# Patient Record
Sex: FEMALE | Race: WHITE | Employment: PART TIME | URBAN - NONMETROPOLITAN AREA
[De-identification: names, ages, dates, MRNs, and addresses within clinical notes are randomized per-mention and may not be internally consistent; named-entity substitution may affect disease eponyms.]

---
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
## 2021-04-06 ENCOUNTER — OFFICE VISIT (OUTPATIENT)
Dept: FAMILY MEDICINE CLINIC | Age: 21
End: 2021-04-06
Payer: COMMERCIAL

## 2021-04-06 VITALS
HEART RATE: 76 BPM | WEIGHT: 132 LBS | BODY MASS INDEX: 22.66 KG/M2 | DIASTOLIC BLOOD PRESSURE: 80 MMHG | SYSTOLIC BLOOD PRESSURE: 120 MMHG

## 2021-04-06 DIAGNOSIS — Z00.00 WELL ADULT EXAM: Primary | ICD-10-CM

## 2021-04-06 DIAGNOSIS — N94.6 DYSMENORRHEA: ICD-10-CM

## 2021-04-06 DIAGNOSIS — J30.81 ALLERGIC RHINITIS DUE TO ANIMAL HAIR AND DANDER: ICD-10-CM

## 2021-04-06 DIAGNOSIS — G43.829 MENSTRUAL MIGRAINE WITHOUT STATUS MIGRAINOSUS, NOT INTRACTABLE: ICD-10-CM

## 2021-04-06 PROCEDURE — 99395 PREV VISIT EST AGE 18-39: CPT | Performed by: PHYSICIAN ASSISTANT

## 2021-04-06 RX ORDER — NORETHINDRONE ACETATE AND ETHINYL ESTRADIOL 1MG-20(21)
KIT ORAL
Qty: 28 TABLET | Refills: 11 | Status: SHIPPED | OUTPATIENT
Start: 2021-04-06 | End: 2022-03-04

## 2021-04-06 RX ORDER — MONTELUKAST SODIUM 10 MG/1
10 TABLET ORAL NIGHTLY
Qty: 30 TABLET | Refills: 11 | Status: SHIPPED | OUTPATIENT
Start: 2021-04-06

## 2021-04-06 SDOH — ECONOMIC STABILITY: INCOME INSECURITY: HOW HARD IS IT FOR YOU TO PAY FOR THE VERY BASICS LIKE FOOD, HOUSING, MEDICAL CARE, AND HEATING?: NOT HARD AT ALL

## 2021-04-06 SDOH — ECONOMIC STABILITY: FOOD INSECURITY: WITHIN THE PAST 12 MONTHS, THE FOOD YOU BOUGHT JUST DIDN'T LAST AND YOU DIDN'T HAVE MONEY TO GET MORE.: NEVER TRUE

## 2021-04-06 ASSESSMENT — PATIENT HEALTH QUESTIONNAIRE - PHQ9
1. LITTLE INTEREST OR PLEASURE IN DOING THINGS: 0
SUM OF ALL RESPONSES TO PHQ9 QUESTIONS 1 & 2: 0
SUM OF ALL RESPONSES TO PHQ QUESTIONS 1-9: 0
SUM OF ALL RESPONSES TO PHQ QUESTIONS 1-9: 0

## 2021-04-07 NOTE — PROGRESS NOTES
CHIEF COMPLAINT  Chief Complaint   Patient presents with    Annual Exam    Other     blood sugar issue       [address] is a 22 y.o. female who presents to the office for annual wellness examination and follow-up of chronic medical conditions. Patient says that she has had a few episodes of a feeling like she might pass out over the past few months. Her friend checked her blood sugar and it was 170. Patient tried increasing her protein and cutting back on carbohydrates. Patient says that dysmenorrhea and migraines have been well-controlled on current treatment. She takes Singulair for allergies and finds this effective as well. Patient says that overall she is doing well and denies concerns or complaints today. ROS  All other review of systems negative, except for those noted. PAST MEDICAL HISTORY    Past Medical History:   Diagnosis Date    Asthma     Headache        SURGICAL HISTORY    History reviewed. No pertinent surgical history.     FAMILY HISTORY    Family History   Problem Relation Age of Onset    Asthma Mother     Other Mother         migranes    Heart Disease Maternal Grandfather         balloon surgery    Hypertension Maternal Grandfather     Kidney Disease Maternal Grandfather         stones    High Cholesterol Maternal Grandfather     Cancer Paternal Grandfather         leukemia    Asthma Maternal Aunt     Diabetes Maternal Grandmother        SOCIAL HISTORY    Social History     Socioeconomic History    Marital status: Single     Spouse name: None    Number of children: None    Years of education: None    Highest education level: None   Occupational History    None   Social Needs    Financial resource strain: Not hard at all   Marta-Rivka insecurity     Worry: Never true     Inability: Never true    Transportation needs     Medical: No     Non-medical: No   Tobacco Use    Smoking status: Never Smoker    Smokeless tobacco: Never Used   Substance and Sexual Activity    Alcohol use: No    Drug use: No    Sexual activity: None   Lifestyle    Physical activity     Days per week: None     Minutes per session: None    Stress: None   Relationships    Social connections     Talks on phone: None     Gets together: None     Attends Lutheran service: None     Active member of club or organization: None     Attends meetings of clubs or organizations: None     Relationship status: None    Intimate partner violence     Fear of current or ex partner: None     Emotionally abused: None     Physically abused: None     Forced sexual activity: None   Other Topics Concern    None   Social History Narrative    None       MEDICATIONS  Current Outpatient Medications   Medication Sig Dispense Refill    norethindrone-ethinyl estradiol (BLISOVI FE 1/20) 1-20 MG-MCG per tablet TAKE 1 TABLET BY MOUTH ONE TIME A DAY 28 tablet 11    montelukast (SINGULAIR) 10 MG tablet Take 1 tablet by mouth nightly 30 tablet 11    albuterol sulfate  (90 Base) MCG/ACT inhaler Inhale 2 puffs into the lungs 4 times daily as needed for Wheezing 1 Inhaler 0     No current facility-administered medications for this visit. ALLERGIES  Allergies   Allergen Reactions    Seasonal        PHYSICAL EXAM:   Vital Signs: /80 (Site: Left Upper Arm, Position: Sitting, Cuff Size: Medium Adult)   Pulse 76   Wt 132 lb (59.9 kg)   LMP 03/16/2021   BMI 22.66 kg/m²   Constitutional:  Alert and oriented x 3   HENT:  Normocephalic, Atraumatic, Bilateral external ears normal, Oropharynx moist, No oral exudates, Nose normal. Neck- Normal range of motion, No tenderness, Supple, No stridor. Eyes:  PERRL, Conjunctiva normal, No discharge. Respiratory:  Normal breath sounds, No respiratory distress, No wheezing, No chest tenderness. Cardiovascular:  Normal heart rate, Normal rhythm  GI:  Bowel sounds normal, Soft, No tenderness, No masses, No pulsatile masses.    Musculoskeletal:  Intact distal pulses, No edema, No tenderness, No cyanosis, No clubbing. Good range of motion in all major joints. No tenderness to palpation or major deformities noted. Back- No tenderness. Integument:  Warm, Dry, No erythema, No rash. Lymphatic:  No lymphadenopathy noted. Neurologic:  Alert & oriented x 3, Normal motor function, Normal sensory function, No focal deficits noted. Psychiatric:  Affect normal, Mood normal.     RESULTS  Ordered in this encounter. FINAL DIAGNOSIS AND ORDERS   Diagnosis Orders   1. Well adult exam  Comprehensive Metabolic Panel    Lipid Panel    Hemoglobin A1C    C-Peptide    Insulin, Total   2. Menstrual migraine without status migrainosus, not intractable  norethindrone-ethinyl estradiol (BLISOVI FE 1/20) 1-20 MG-MCG per tablet   3. Dysmenorrhea  norethindrone-ethinyl estradiol (BLISOVI FE 1/20) 1-20 MG-MCG per tablet   4. Allergic rhinitis due to animal hair and dander  montelukast (SINGULAIR) 10 MG tablet       ASSESSMENT & PLAN  1. Interval history reviewed. Update fasting laboratory studies. Chronic medications refilled for patient. Healthy diet and routine exercise. Discussed COVID vaccination in detail. Follow-up annually and PRN. DISCHARGE MEDS  Outpatient Encounter Medications as of 4/6/2021   Medication Sig Dispense Refill    norethindrone-ethinyl estradiol (BLISOVI FE 1/20) 1-20 MG-MCG per tablet TAKE 1 TABLET BY MOUTH ONE TIME A DAY 28 tablet 11    montelukast (SINGULAIR) 10 MG tablet Take 1 tablet by mouth nightly 30 tablet 11    albuterol sulfate  (90 Base) MCG/ACT inhaler Inhale 2 puffs into the lungs 4 times daily as needed for Wheezing 1 Inhaler 0    [DISCONTINUED] norethindrone-ethinyl estradiol (BLISOVI FE 1/20) 1-20 MG-MCG per tablet TAKE 1 TABLET BY MOUTH ONE TIME A DAY 28 tablet 11    [DISCONTINUED] montelukast (SINGULAIR) 10 MG tablet Take 1 tablet by mouth nightly 30 tablet 11     No facility-administered encounter medications on file as of 4/6/2021.

## 2021-04-16 ENCOUNTER — HOSPITAL ENCOUNTER (OUTPATIENT)
Dept: LAB | Age: 21
Discharge: HOME | End: 2021-04-16
Payer: COMMERCIAL

## 2021-04-16 DIAGNOSIS — Z00.00 WELL ADULT EXAM: ICD-10-CM

## 2021-04-16 LAB
ALBUMIN SERPL-MCNC: 4.6 G/DL (ref 3.5–5.2)
ALBUMIN/GLOBULIN RATIO: 1.4 (ref 1–2.5)
ALP BLD-CCNC: 52 U/L (ref 35–104)
ALT SERPL-CCNC: 19 U/L (ref 5–33)
ANION GAP SERPL CALCULATED.3IONS-SCNC: 7 MMOL/L (ref 9–17)
AST SERPL-CCNC: 19 U/L
BILIRUB SERPL-MCNC: 0.46 MG/DL (ref 0.3–1.2)
BUN BLDV-MCNC: 13 MG/DL (ref 6–20)
BUN/CREAT BLD: 18 (ref 9–20)
C-PEPTIDE: 1.9 NG/ML (ref 1.1–4.4)
CALCIUM SERPL-MCNC: 9.9 MG/DL (ref 8.6–10.4)
CHLORIDE BLD-SCNC: 104 MMOL/L (ref 98–107)
CHOLESTEROL/HDL RATIO: 2.1
CHOLESTEROL: 172 MG/DL
CO2: 28 MMOL/L (ref 20–31)
CREAT SERPL-MCNC: 0.72 MG/DL (ref 0.5–0.9)
ESTIMATED AVERAGE GLUCOSE: 100 MG/DL
GFR AFRICAN AMERICAN: >60 ML/MIN
GFR NON-AFRICAN AMERICAN: >60 ML/MIN
GFR SERPL CREATININE-BSD FRML MDRD: ABNORMAL ML/MIN/{1.73_M2}
GFR SERPL CREATININE-BSD FRML MDRD: ABNORMAL ML/MIN/{1.73_M2}
GLUCOSE BLD-MCNC: 90 MG/DL (ref 70–99)
HBA1C MFR BLD: 5.1 % (ref 4–6)
HDLC SERPL-MCNC: 82 MG/DL
INSULIN COMMENT: NORMAL
INSULIN REFERENCE RANGE:: NORMAL
INSULIN: 11.4 MU/L
LDL CHOLESTEROL: 71 MG/DL (ref 0–130)
POTASSIUM SERPL-SCNC: 4.3 MMOL/L (ref 3.7–5.3)
SODIUM BLD-SCNC: 139 MMOL/L (ref 135–144)
TOTAL PROTEIN: 7.8 G/DL (ref 6.4–8.3)
TRIGL SERPL-MCNC: 93 MG/DL
VLDLC SERPL CALC-MCNC: NORMAL MG/DL (ref 1–30)

## 2021-04-16 PROCEDURE — 80061 LIPID PANEL: CPT

## 2021-04-16 PROCEDURE — 83525 ASSAY OF INSULIN: CPT

## 2021-04-16 PROCEDURE — 36415 COLL VENOUS BLD VENIPUNCTURE: CPT

## 2021-04-16 PROCEDURE — 84681 ASSAY OF C-PEPTIDE: CPT

## 2021-04-16 PROCEDURE — 80053 COMPREHEN METABOLIC PANEL: CPT

## 2021-04-16 PROCEDURE — 83036 HEMOGLOBIN GLYCOSYLATED A1C: CPT

## 2021-04-19 ENCOUNTER — TELEPHONE (OUTPATIENT)
Dept: FAMILY MEDICINE CLINIC | Age: 21
End: 2021-04-19

## 2021-08-17 ENCOUNTER — NURSE TRIAGE (OUTPATIENT)
Dept: OTHER | Facility: CLINIC | Age: 21
End: 2021-08-17

## 2021-08-17 ENCOUNTER — TELEPHONE (OUTPATIENT)
Dept: FAMILY MEDICINE CLINIC | Age: 21
End: 2021-08-17

## 2021-08-17 NOTE — TELEPHONE ENCOUNTER
----- Message from Jacklyn Xin sent at 8/17/2021 11:16 AM EDT -----  Subject: Appointment Request    Reason for Call: Immediate Return from RN Triage    QUESTIONS  Type of Appointment? Established Patient  Reason for appointment request? No appointments available during search  Additional Information for Provider? Pt has been having migranes for a   while andhave been getting wrose. Nurse demar suggested that pt was seen   today 8/17/21 but Pt would like to wait and be seen by PCP. No appt popped   up for me and was unable to get in touch with office   ---------------------------------------------------------------------------  --------------  [employer]  What is the best way for the office to contact you? OK to leave message on   voicemail  Preferred Call Back Phone Number? [phone]  ---------------------------------------------------------------------------  --------------  SCRIPT ANSWERS  Patient needs to be seen today? Yes  Nurse Name? demar  Have you been diagnosed with, awaiting test results for, or told that you   are suspected of having COVID-19 (Coronavirus)? (If patient has tested   negative or was tested as a requirement for work, school, or travel and   not based on symptoms, answer no)? No  Do you currently have flu-like symptoms including fever or chills, cough,   shortness of breath, difficulty breathing, or new loss of taste or smell? No  Have you had close contact with someone with COVID-19 in the last 14 days? No  (Service Expert  click yes below to proceed with [employer] As Usual   Scheduling)?  Yes

## 2021-08-17 NOTE — TELEPHONE ENCOUNTER
Received call from [facility] with [employer] Complaint. Brief description of triage: HA    Triage indicates for patient to be seen today    Care advice provided, patient verbalizes understanding; denies any other questions or concerns; instructed to call back for any new or worsening symptoms. Writer provided warm transfer to [facility] for appointment scheduling. Attention Provider: Thank you for allowing me to participate in the care of your patient. The patient was connected to triage in response to information provided to the [facility]. Please do not respond through this encounter as the response is not directed to a shared pool. Reason for Disposition   Patient wants to be seen    Answer Assessment - Initial Assessment Questions  1. LOCATION: \"Where does it hurt? \"       Back of head and into eyes    2. ONSET: \"When did the headache start? \" (Minutes, hours or days)       This morning    3. PATTERN: \"Does the pain come and go, or has it been constant since it started? \"      Constant    4. SEVERITY: \"How bad is the pain? \" and \"What does it keep you from doing? \"  (e.g., Scale 1-10; mild, moderate, or severe)    - MILD (1-3): doesn't interfere with normal activities     - MODERATE (4-7): interferes with normal activities or awakens from sleep     - SEVERE (8-10): excruciating pain, unable to do any normal activities         8/10    5. RECURRENT SYMPTOM: \"Have you ever had headaches before? \" If so, ask: \"When was the last time? \" and \"What happened that time? \"       Yes- history of migraine    6. CAUSE: \"What do you think is causing the headache? \"      History of migraine    7. MIGRAINE: \"Have you been diagnosed with migraine headaches? \" If so, ask: \"Is this headache similar? \"       Yes     8. HEAD INJURY: \"Has there been any recent injury to the head? \"       Denies    9. OTHER SYMPTOMS: \"Do you have any other symptoms? \" (fever, stiff neck, eye pain, sore throat, cold symptoms)      Eye pain, stiff neck- normal for her when she gets a migraine     10. PREGNANCY: \"Is there any chance you are pregnant? \" \"When was your last menstrual period? \"        Denies    Protocols used: HEADACHE-ADULT-[place]

## 2021-08-17 NOTE — TELEPHONE ENCOUNTER
Called patient has headache for years . You prescribed BCP that helped some but no longer helps. Takes execdrin but no relief. Headace weekly sometimes 2-3 with nausea. headaches used to be only when around menstrual cycle now anytime. Has aura rt eye with pain in left side. Also nausea with headache. Do you want to use a same day appt?

## 2021-08-19 ENCOUNTER — OFFICE VISIT (OUTPATIENT)
Dept: FAMILY MEDICINE CLINIC | Age: 21
End: 2021-08-19
Payer: COMMERCIAL

## 2021-08-19 VITALS
DIASTOLIC BLOOD PRESSURE: 70 MMHG | BODY MASS INDEX: 24.95 KG/M2 | WEIGHT: 140.8 LBS | HEART RATE: 72 BPM | HEIGHT: 63 IN | SYSTOLIC BLOOD PRESSURE: 122 MMHG

## 2021-08-19 DIAGNOSIS — G43.101 MIGRAINE WITH AURA AND WITH STATUS MIGRAINOSUS, NOT INTRACTABLE: Primary | ICD-10-CM

## 2021-08-19 PROCEDURE — G8420 CALC BMI NORM PARAMETERS: HCPCS | Performed by: PHYSICIAN ASSISTANT

## 2021-08-19 PROCEDURE — 99213 OFFICE O/P EST LOW 20 MIN: CPT | Performed by: PHYSICIAN ASSISTANT

## 2021-08-19 PROCEDURE — G8427 DOCREV CUR MEDS BY ELIG CLIN: HCPCS | Performed by: PHYSICIAN ASSISTANT

## 2021-08-19 PROCEDURE — 1036F TOBACCO NON-USER: CPT | Performed by: PHYSICIAN ASSISTANT

## 2021-08-19 RX ORDER — BUTALBITAL, ACETAMINOPHEN AND CAFFEINE 50; 325; 40 MG/1; MG/1; MG/1
1 TABLET ORAL EVERY 6 HOURS PRN
Qty: 30 TABLET | Refills: 0 | Status: SHIPPED | OUTPATIENT
Start: 2021-08-19 | End: 2022-06-09 | Stop reason: ALTCHOICE

## 2021-08-19 RX ORDER — PROPRANOLOL HCL 60 MG
60 CAPSULE, EXTENDED RELEASE 24HR ORAL DAILY
Qty: 30 CAPSULE | Refills: 3 | Status: SHIPPED | OUTPATIENT
Start: 2021-08-19 | End: 2021-12-14

## 2021-08-19 ASSESSMENT — ENCOUNTER SYMPTOMS
VOMITING: 0
RESPIRATORY NEGATIVE: 1
PHOTOPHOBIA: 1
NAUSEA: 1

## 2021-08-19 NOTE — PROGRESS NOTES
Subjective:      Patient ID: Sharon Arvizu is a 22 y.o. female. Headache   This is a chronic problem. The current episode started more than 1 year ago. The problem has been gradually worsening. The pain quality is similar to prior headaches. The quality of the pain is described as aching. Associated symptoms include nausea, phonophobia and photophobia. Pertinent negatives include no abnormal behavior or vomiting. The symptoms are aggravated by weather changes and menstrual cycle. Her past medical history is significant for migraine headaches and migraines in the family. Review of Systems   Constitutional: Negative. HENT: Negative. Eyes: Positive for photophobia. Respiratory: Negative. Cardiovascular: Negative. Gastrointestinal: Positive for nausea. Negative for vomiting. Neurological: Positive for headaches. Objective:   Physical Exam  HENT:      Head: Normocephalic. Right Ear: Tympanic membrane normal.      Left Ear: Tympanic membrane normal.   Cardiovascular:      Rate and Rhythm: Normal rate. Pulmonary:      Effort: Pulmonary effort is normal.      Breath sounds: Normal breath sounds. Abdominal:      General: Abdomen is flat. Skin:     General: Skin is warm and dry. Neurological:      General: No focal deficit present. Mental Status: She is alert and oriented to person, place, and time. Cranial Nerves: No cranial nerve deficit. Psychiatric:         Mood and Affect: Mood normal.         Behavior: Behavior normal.         Assessment:      1. Migraine with aura and with status migrainosus, not intractable          Plan:      Start Inderal LA 60 mg daily. Monitor headache triggers. Patient intolerant to triptans. Fioricet PRN breakthrough headaches. Supportive care advised. Follow-up in three months/sooner PRN.         VIVEK Henning

## 2021-12-14 DIAGNOSIS — G43.101 MIGRAINE WITH AURA AND WITH STATUS MIGRAINOSUS, NOT INTRACTABLE: ICD-10-CM

## 2021-12-14 RX ORDER — PROPRANOLOL HCL 60 MG
CAPSULE, EXTENDED RELEASE 24HR ORAL
Qty: 30 CAPSULE | Refills: 1 | Status: SHIPPED | OUTPATIENT
Start: 2021-12-14 | End: 2022-01-07 | Stop reason: SDUPTHER

## 2021-12-18 DIAGNOSIS — J30.81 ALLERGIC RHINITIS DUE TO ANIMAL HAIR AND DANDER: ICD-10-CM

## 2021-12-20 RX ORDER — MONTELUKAST SODIUM 10 MG/1
10 TABLET ORAL NIGHTLY
Qty: 30 TABLET | Refills: 11 | OUTPATIENT
Start: 2021-12-20

## 2021-12-20 NOTE — TELEPHONE ENCOUNTER
Verified with pharmacy and she has refills on file.  They will prepare for patient       Rayo Garland is requesting a refill on the following medication(s):  Requested Prescriptions     Pending Prescriptions Disp Refills    montelukast (SINGULAIR) 10 MG tablet 30 tablet 11     Sig: Take 1 tablet by mouth nightly       Last Visit Date (If Applicable):  5/65/2894    Next Visit Date:    1/7/2022

## 2022-01-07 ENCOUNTER — OFFICE VISIT (OUTPATIENT)
Dept: FAMILY MEDICINE CLINIC | Age: 22
End: 2022-01-07
Payer: COMMERCIAL

## 2022-01-07 VITALS
DIASTOLIC BLOOD PRESSURE: 60 MMHG | HEIGHT: 63 IN | SYSTOLIC BLOOD PRESSURE: 90 MMHG | WEIGHT: 155 LBS | HEART RATE: 60 BPM | BODY MASS INDEX: 27.46 KG/M2

## 2022-01-07 DIAGNOSIS — G43.101 MIGRAINE WITH AURA AND WITH STATUS MIGRAINOSUS, NOT INTRACTABLE: ICD-10-CM

## 2022-01-07 PROCEDURE — G8419 CALC BMI OUT NRM PARAM NOF/U: HCPCS | Performed by: PHYSICIAN ASSISTANT

## 2022-01-07 PROCEDURE — G8484 FLU IMMUNIZE NO ADMIN: HCPCS | Performed by: PHYSICIAN ASSISTANT

## 2022-01-07 PROCEDURE — G8428 CUR MEDS NOT DOCUMENT: HCPCS | Performed by: PHYSICIAN ASSISTANT

## 2022-01-07 PROCEDURE — 99213 OFFICE O/P EST LOW 20 MIN: CPT | Performed by: PHYSICIAN ASSISTANT

## 2022-01-07 PROCEDURE — 1036F TOBACCO NON-USER: CPT | Performed by: PHYSICIAN ASSISTANT

## 2022-01-07 RX ORDER — PROPRANOLOL HCL 60 MG
CAPSULE, EXTENDED RELEASE 24HR ORAL
Qty: 30 CAPSULE | Refills: 5 | Status: SHIPPED | OUTPATIENT
Start: 2022-01-07 | End: 2022-06-09 | Stop reason: ALTCHOICE

## 2022-01-07 ASSESSMENT — PATIENT HEALTH QUESTIONNAIRE - PHQ9
2. FEELING DOWN, DEPRESSED OR HOPELESS: 0
1. LITTLE INTEREST OR PLEASURE IN DOING THINGS: 0
SUM OF ALL RESPONSES TO PHQ QUESTIONS 1-9: 0
SUM OF ALL RESPONSES TO PHQ9 QUESTIONS 1 & 2: 0
SUM OF ALL RESPONSES TO PHQ QUESTIONS 1-9: 0

## 2022-01-07 ASSESSMENT — ENCOUNTER SYMPTOMS: RESPIRATORY NEGATIVE: 1

## 2022-01-07 NOTE — PROGRESS NOTES
Subjective:      Patient ID: Yuni Ludwig is a 22 y.o. female. Headache   This is a chronic problem. The current episode started more than 1 year ago. The problem has been gradually improving. The pain quality is similar to prior headaches. Pertinent negatives include no abnormal behavior, anorexia or dizziness. The symptoms are aggravated by menstrual cycle. She has tried beta blockers and ergotamines for the symptoms. The treatment provided moderate relief. Her past medical history is significant for migraine headaches and migraines in the family. Review of Systems   Constitutional: Negative. HENT: Negative. Respiratory: Negative. Cardiovascular: Negative. Gastrointestinal: Negative for anorexia. Neurological: Positive for headaches. Negative for dizziness. Objective:   Physical Exam  HENT:      Head: Normocephalic. Right Ear: Tympanic membrane normal.      Left Ear: Tympanic membrane normal.      Mouth/Throat:      Mouth: Mucous membranes are moist.   Eyes:      Pupils: Pupils are equal, round, and reactive to light. Pulmonary:      Effort: Pulmonary effort is normal.      Breath sounds: Normal breath sounds. Musculoskeletal:      Cervical back: Neck supple. Skin:     General: Skin is warm. Neurological:      General: No focal deficit present. Mental Status: She is alert and oriented to person, place, and time. Psychiatric:         Mood and Affect: Mood normal.         Behavior: Behavior normal.         Assessment:      1. Migraine with aura and with status migrainosus, not intractable          Plan:      Continue current dose of Inderal.  Continue to monitor headache triggers. Stay well-hydrated. Follow-up in six months/sooner PRN.         VIVEK Mancilla

## 2022-02-11 ENCOUNTER — OFFICE VISIT (OUTPATIENT)
Dept: PRIMARY CARE CLINIC | Age: 22
End: 2022-02-11
Payer: COMMERCIAL

## 2022-02-11 VITALS
BODY MASS INDEX: 25.78 KG/M2 | OXYGEN SATURATION: 98 % | DIASTOLIC BLOOD PRESSURE: 60 MMHG | HEART RATE: 81 BPM | SYSTOLIC BLOOD PRESSURE: 100 MMHG | WEIGHT: 151 LBS | TEMPERATURE: 99 F | HEIGHT: 64 IN

## 2022-02-11 DIAGNOSIS — B96.89 ACUTE BACTERIAL SINUSITIS: ICD-10-CM

## 2022-02-11 DIAGNOSIS — J01.40 ACUTE PANSINUSITIS, RECURRENCE NOT SPECIFIED: ICD-10-CM

## 2022-02-11 DIAGNOSIS — R51.9 SINUS HEADACHE: Primary | ICD-10-CM

## 2022-02-11 DIAGNOSIS — J01.90 ACUTE BACTERIAL SINUSITIS: ICD-10-CM

## 2022-02-11 PROCEDURE — G8419 CALC BMI OUT NRM PARAM NOF/U: HCPCS | Performed by: NURSE PRACTITIONER

## 2022-02-11 PROCEDURE — 99213 OFFICE O/P EST LOW 20 MIN: CPT | Performed by: NURSE PRACTITIONER

## 2022-02-11 PROCEDURE — G8484 FLU IMMUNIZE NO ADMIN: HCPCS | Performed by: NURSE PRACTITIONER

## 2022-02-11 PROCEDURE — G8427 DOCREV CUR MEDS BY ELIG CLIN: HCPCS | Performed by: NURSE PRACTITIONER

## 2022-02-11 PROCEDURE — 1036F TOBACCO NON-USER: CPT | Performed by: NURSE PRACTITIONER

## 2022-02-11 RX ORDER — AMOXICILLIN AND CLAVULANATE POTASSIUM 875; 125 MG/1; MG/1
1 TABLET, FILM COATED ORAL 2 TIMES DAILY
Qty: 20 TABLET | Refills: 0 | Status: SHIPPED | OUTPATIENT
Start: 2022-02-11 | End: 2022-02-21

## 2022-02-11 RX ORDER — LORATADINE 10 MG/1
10 TABLET ORAL DAILY
Qty: 30 TABLET | Refills: 0 | Status: SHIPPED | OUTPATIENT
Start: 2022-02-11 | End: 2022-03-13

## 2022-02-11 RX ORDER — FLUTICASONE PROPIONATE 50 MCG
1 SPRAY, SUSPENSION (ML) NASAL 2 TIMES DAILY
Qty: 16 G | Refills: 0 | Status: SHIPPED | OUTPATIENT
Start: 2022-02-11

## 2022-02-11 ASSESSMENT — ENCOUNTER SYMPTOMS
DIARRHEA: 0
SINUS PRESSURE: 1
SORE THROAT: 0
RESPIRATORY NEGATIVE: 1
NAUSEA: 0
ALLERGIC/IMMUNOLOGIC NEGATIVE: 1
COUGH: 0
SINUS COMPLAINT: 1
VOMITING: 0
EYES NEGATIVE: 1

## 2022-02-11 ASSESSMENT — PATIENT HEALTH QUESTIONNAIRE - PHQ9
SUM OF ALL RESPONSES TO PHQ9 QUESTIONS 1 & 2: 0
SUM OF ALL RESPONSES TO PHQ QUESTIONS 1-9: 0
2. FEELING DOWN, DEPRESSED OR HOPELESS: 0
SUM OF ALL RESPONSES TO PHQ QUESTIONS 1-9: 0
1. LITTLE INTEREST OR PLEASURE IN DOING THINGS: 0

## 2022-02-11 NOTE — PROGRESS NOTES
[address] [facility] A department of [facility]  Phone: [phone]  Fax: [phone]      Nemesio aMrsh is a 25 y.o. female who presents to the [facility] today for her medical conditions/complaints as noted below. Nemesio Marsh is c/o of Sinus Problem (sinus pain and pressure, ear pain,headache,sinus congestion  sx began over 1 week ago. bf had same thing and it was a sinus infection. pt took home covid test and pt was negative. )          HPI:     Sinus Problem  This is a new problem. The current episode started in the past 7 days (7-10 days). The problem is unchanged. There has been no fever. The pain is moderate. Associated symptoms include congestion, ear pain, sinus pressure and sneezing. Pertinent negatives include no coughing or sore throat. Treatments tried: alkasetlzer, sudafe, ibuprofen. The treatment provided mild relief. Past Medical History:   Diagnosis Date    Asthma     Headache         Allergies   Allergen Reactions    Seasonal     Imitrex [Sumatriptan] Nausea And Vomiting       Wt Readings from Last 3 Encounters:   02/11/22 151 lb (68.5 kg)   01/07/22 155 lb (70.3 kg)   08/19/21 140 lb 12.8 oz (63.9 kg)     BP Readings from Last 3 Encounters:   02/11/22 100/60   01/07/22 90/60   08/19/21 122/70      Temp Readings from Last 3 Encounters:   02/11/22 99 °F (37.2 °C) (Tympanic)   04/17/20 98.2 °F (36.8 °C) (Tympanic)   12/26/19 98.6 °F (37 °C) (Tympanic)     Pulse Readings from Last 3 Encounters:   02/11/22 81   01/07/22 60   08/19/21 72     SpO2 Readings from Last 3 Encounters:   02/11/22 98%   06/01/19 98%   02/08/18 100%       Subjective:      Review of Systems   Constitutional: Positive for fatigue. Negative for appetite change and fever. HENT: Positive for congestion, ear pain, sinus pressure and sneezing. Negative for sore throat. Eyes: Negative. Respiratory: Negative. Negative for cough. Cardiovascular: Negative. Gastrointestinal: Negative for diarrhea, nausea and vomiting. Endocrine: Negative. Genitourinary: Negative. Negative for difficulty urinating and dysuria. Musculoskeletal: Negative. Skin: Negative. Allergic/Immunologic: Negative. Neurological: Negative. Hematological: Negative. Psychiatric/Behavioral: Negative. All other systems reviewed and are negative. Objective:     Vitals:    02/11/22 0940   BP: 100/60   Site: Left Upper Arm   Position: Sitting   Cuff Size: Medium Adult   Pulse: 81   Temp: 99 °F (37.2 °C)   TempSrc: Tympanic   SpO2: 98%   Weight: 151 lb (68.5 kg)   Height: 5' 4\" (1.626 m)     Body mass index is 25.92 kg/m². /60 (Site: Left Upper Arm, Position: Sitting, Cuff Size: Medium Adult)   Pulse 81   Temp 99 °F (37.2 °C) (Tympanic)   Ht 5' 4\" (1.626 m)   Wt 151 lb (68.5 kg)   SpO2 98%   BMI 25.92 kg/m²   Physical Exam  Vitals and nursing note reviewed. Constitutional:       General: She is not in acute distress. Appearance: She is not ill-appearing. HENT:      Right Ear: Hearing, ear canal and external ear normal. A middle ear effusion is present. There is no impacted cerumen. Tympanic membrane is not retracted. Left Ear: Hearing, ear canal and external ear normal. A middle ear effusion is present. There is no impacted cerumen. Tympanic membrane is retracted. Nose: Mucosal edema, congestion and rhinorrhea present. Right Turbinates: Enlarged and swollen. Left Turbinates: Enlarged and swollen. Right Sinus: Maxillary sinus tenderness and frontal sinus tenderness present. Left Sinus: Maxillary sinus tenderness and frontal sinus tenderness present. Mouth/Throat:      Lips: Pink. Mouth: Mucous membranes are moist.      Pharynx: Uvula midline. Oropharyngeal exudate and posterior oropharyngeal erythema present. Tonsils: No tonsillar exudate or tonsillar abscesses. 1+ on the right.  2+ on the left. Comments: Moderate amount of mucus noted in the pharynx. Eyes:      Conjunctiva/sclera: Conjunctivae normal.      Pupils: Pupils are equal, round, and reactive to light. Cardiovascular:      Rate and Rhythm: Normal rate and regular rhythm. Pulses: Normal pulses. Heart sounds: Normal heart sounds. Pulmonary:      Effort: Pulmonary effort is normal. No respiratory distress. Breath sounds: Normal breath sounds. No decreased air movement. No decreased breath sounds, wheezing, rhonchi or rales. Chest:   Breasts:      Right: No supraclavicular adenopathy. Left: No supraclavicular adenopathy. Musculoskeletal:         General: Normal range of motion. Cervical back: Normal range of motion. Lymphadenopathy:      Cervical: Cervical adenopathy present. Right cervical: Superficial cervical adenopathy present. No posterior cervical adenopathy. Left cervical: No superficial or posterior cervical adenopathy. Upper Body:      Right upper body: No supraclavicular adenopathy. Left upper body: No supraclavicular adenopathy. Skin:     General: Skin is warm and dry. Capillary Refill: Capillary refill takes less than 2 seconds. Neurological:      General: No focal deficit present. Mental Status: She is alert and oriented to person, place, and time. Mental status is at baseline. Psychiatric:         Mood and Affect: Mood normal.         Behavior: Behavior normal.         Judgment: Judgment normal.         Assessment:      Diagnosis Orders   1. Sinus headache  amoxicillin-clavulanate (AUGMENTIN) 875-125 MG per tablet    loratadine (CLARITIN) 10 MG tablet    fluticasone (FLONASE) 50 MCG/ACT nasal spray   2. Acute pansinusitis, recurrence not specified  amoxicillin-clavulanate (AUGMENTIN) 875-125 MG per tablet    loratadine (CLARITIN) 10 MG tablet    fluticasone (FLONASE) 50 MCG/ACT nasal spray   3.  Acute bacterial sinusitis  amoxicillin-clavulanate (AUGMENTIN) 875-125 MG per tablet         Plan:     Discussed exam, POCT findings, plan of care (including prescriptive and supportive as listed below) and follow-up at length with patient. Reviewed all prescribed and recommended medications, administration and side effects. Encouraged to return to the clinic for no improvement and or worsening of symptoms. Patient instructed to go to ER or call 911 if any difficulty breathing, shortness of breath, inability to swallow, hives or temp greater than 103 degrees. All questions were answered and they verbalized understanding and were agreeable with the plan. Return if symptoms worsen or fail to improve.         Electronically signed by JESSA Stinson CNP on 2/11/2022 at 10:11 AM

## 2022-02-11 NOTE — PATIENT INSTRUCTIONS
Recommended over the counter medications/treatments: The use of an antihistamine (Claritin or Zyrtec) and a nasal steroid spray (Flonase or Nasacort) may help with sinus congestion and drainage. Probiotics daily may help boost immune system. Phenylephrine      Make sure to stay well hydrated by drinking plenty of water. Follow up with primary care provider in 1 to 2 days or as needed if no improvement or worsening of your symptoms. Patient Education        Saline Nasal Washes: Care Instructions  Your Care Instructions     Saline nasal washes help keep the nasal passages open by washing out thick or dried mucus. This simple remedy can help relieve symptoms of allergies, sinusitis, and colds. It also can make the nose feel more comfortable by keeping the mucous membranes moist. You may notice a little burning sensation in your nose the first few times you use the solution, but this usually gets better in a few days. Follow-up care is a key part of your treatment and safety. Be sure to make and go to all appointments, and call your doctor if you are having problems. It's also a good idea to know your test results and keep a list of the medicines you take. How can you care for yourself at home? · You can buy premixed saline solution in a squeeze bottle or other sinus rinse products at a drugstore. Read and follow the instructions on the label. · You also can make your own saline solution by adding 1 teaspoon of salt and 1 teaspoon of baking soda to 2 cups of distilled water. · If you use a homemade solution, pour a small amount into a clean bowl. Using a rubber bulb syringe, squeeze the syringe and place the tip in the salt water. Pull a small amount of the salt water into the syringe by relaxing your hand. · Sit down with your head tilted slightly back. Do not lie down. Put the tip of the bulb syringe or the squeeze bottle a little way into one of your nostrils.  Gently drip or squirt a few drops into the nostril. Repeat with the other nostril. Some sneezing and gagging are normal at first.  · Gently blow your nose. · Wipe the syringe or bottle tip clean after each use. · Repeat this 2 or 3 times a day. · Use nasal washes gently if you have nosebleeds often. When should you call for help? Watch closely for changes in your health, and be sure to contact your doctor if:    · You often get nosebleeds.     · You have problems doing the nasal washes. Where can you learn more? Go to [email or web address] org and sign in to your "[employer] account. Enter [phone] in the [employer] box to learn more about \"Saline Nasal Washes: Care Instructions. \"     If you do not have an account, please click on the \"Sign Up Now\" link. Current as of: September 8, 2021               Content Version: 13.1  © 2006-2021 [employer]. Care instructions adapted under license by [facility]. If you have questions about a medical condition or this instruction, always ask your healthcare professional. [place] [address] any warranty or liability for your use of this information. Patient Education        phenylephrine (oral)  Pronunciation:  FEN il EFF rin  Brand:  Nasop, Sudafed PE, Sudogest PE  What is the most important information I should know about phenylephrine? Do not use this medicine if you have used an MAO inhibitor in the past 14 days, such as isocarboxazid, linezolid, methylene blue injection, phenelzine, rasagiline, selegiline, or tranylcypromine. What is phenylephrine? Phenylephrine is a decongestant that is used to treat stuffy nose and sinus congestion caused by the common cold, hay fever, or other allergies. Phenylephrine may also be used for purposes not listed in this medication guide. What should I discuss with my healthcare provider before taking phenylephrine? Do not use phenylephrine if you have used an MAO inhibitor in the past 14 days. A dangerous drug interaction could occur. MAO inhibitors include isocarboxazid, linezolid, methylene blue injection, phenelzine, rasagiline, selegiline, tranylcypromine, and others. You should not use phenylephrine if you are allergic to it. Ask a doctor or pharmacist if this medicine is safe to use if you have:  · heart disease, high blood pressure;  · diabetes;  · a thyroid disorder;  · an enlarged prostate and urination problems; or  · any drug allergies. Ask a doctor before using this medicine if you are pregnant or breast-feeding. The liquid form of this medicine may contain phenylalanine. Check the medication label if you have phenylketonuria (PKU). How should I take phenylephrine? Use exactly as directed on the label, or as prescribed by your doctor. Cold medicine is only for short-term use until your symptoms clear up. Always follow directions on the medicine label about giving cold medicine to a child. You must chew the chewable tablet before you swallow it. Measure liquid medicine carefully. Use the dosing syringe provided, or use a medicine dose-measuring device (not a kitchen spoon). Remove an orally disintegrating tablet from the package only when you are ready to take the medicine. Place the tablet in your mouth and allow it to dissolve, without chewing. Swallow several times as the tablet dissolves. Call your doctor if your symptoms do not improve after 7 days, or if you have a fever, rash, or headaches. If you need surgery, tell your surgeon if you are currently using this medicine. Store at room temperature away from moisture and heat. Do not freeze. What happens if I miss a dose? Since cold medicine is used when needed, you may not be on a dosing schedule. Skip any missed dose if it's almost time for your next dose. Do not use two doses at one time. What happens if I overdose? Seek emergency medical attention or call the Poison Help line at 1-363.604.4607.   What should I avoid while taking phenylephrine? Ask a doctor or pharmacist before using other cough or cold medicines that may contain similar ingredients. What are the possible side effects of phenylephrine? Get emergency medical help if you have signs of an allergic reaction: hives; difficulty breathing; swelling of your face, lips, tongue, or throat. Stop using phenylephrine and call your doctor at once if you have:  · fast, pounding, or irregular heartbeat;  · severe dizziness or nervousness;  · sleep problems (insomnia); or  · increased blood pressure --severe headache, blurred vision, pounding in your neck or ears. Common side effects may include:  · flushing (warmth, redness, or tingly feeling);  · loss of appetite; or  · feeling restless or excited (especially in children). This is not a complete list of side effects and others may occur. Call your doctor for medical advice about side effects. You may report side effects to FDA at 4-089-FDA-3095. What other drugs will affect phenylephrine? Many drugs can affect phenylephrine, and some drugs should not be used at the same time. Tell your doctor about all your current medicines and any medicine you start or stop using. This includes prescription and over-the-counter medicines, vitamins, and herbal products. Not all possible interactions are listed here. Where can I get more information? Your pharmacist can provide more information about phenylephrine. Remember, keep this and all other medicines out of the reach of children, never share your medicines with others, and use this medication only for the indication prescribed. Every effort has been made to ensure that the information provided by [address] is accurate, up-to-date, and complete, but no guarantee is made to that effect. Drug information contained herein may be time sensitive.  [employer] information has been compiled for use by healthcare practitioners and consumers in the [place] and therefore [employer] does not warrant that uses outside of the [place] are appropriate, unless specifically indicated otherwise. [facility]'s drug information does not endorse drugs, diagnose patients or recommend therapy. [facility] drug information is an informational resource designed to assist licensed healthcare practitioners in caring for their patients and/or to serve consumers viewing this service as a supplement to, and not a substitute for, the expertise, skill, knowledge and judgment of healthcare practitioners. The absence of a warning for a given drug or drug combination in no way should be construed to indicate that the drug or drug combination is safe, effective or appropriate for any given patient. [facility] does not assume any responsibility for any aspect of healthcare administered with the aid of information [facility] provides. The information contained herein is not intended to cover all possible uses, directions, precautions, warnings, drug interactions, allergic reactions, or adverse effects. If you have questions about the drugs you are taking, check with your doctor, nurse or pharmacist.  Copyright 3522-3352 [address]. Version: 8.01. Revision date: 5/29/2019. Care instructions adapted under license by [facility]. If you have questions about a medical condition or this instruction, always ask your healthcare professional. [place] [address] any warranty or liability for your use of this information.

## 2022-03-04 DIAGNOSIS — N94.6 DYSMENORRHEA: ICD-10-CM

## 2022-03-04 DIAGNOSIS — G43.829 MENSTRUAL MIGRAINE WITHOUT STATUS MIGRAINOSUS, NOT INTRACTABLE: ICD-10-CM

## 2022-03-04 RX ORDER — NORETHINDRONE ACETATE AND ETHINYL ESTRADIOL 1MG-20(21)
KIT ORAL
Qty: 28 TABLET | Refills: 5 | Status: SHIPPED | OUTPATIENT
Start: 2022-03-04 | End: 2022-08-22

## 2022-06-07 ENCOUNTER — TELEPHONE (OUTPATIENT)
Dept: FAMILY MEDICINE CLINIC | Age: 22
End: 2022-06-07

## 2022-06-07 NOTE — TELEPHONE ENCOUNTER
Pt calling stating her migraines are becoming more constant, her symptoms are worse, numbness in her face and fingers, nausea and the prescription she's been given in the past is just not working, please advise at above number.

## 2022-06-07 NOTE — TELEPHONE ENCOUNTER
Medicine helps on occasion if she gets medication at right time but only dulls it. Headache used to be monthly but last 4 weeks has had daily headache . Her hands go numb prior to headache but today also had numbness in face for a short time.

## 2022-06-09 ENCOUNTER — OFFICE VISIT (OUTPATIENT)
Dept: FAMILY MEDICINE CLINIC | Age: 22
End: 2022-06-09
Payer: COMMERCIAL

## 2022-06-09 VITALS
HEIGHT: 63 IN | WEIGHT: 155.4 LBS | DIASTOLIC BLOOD PRESSURE: 70 MMHG | HEART RATE: 80 BPM | SYSTOLIC BLOOD PRESSURE: 100 MMHG | BODY MASS INDEX: 27.54 KG/M2

## 2022-06-09 DIAGNOSIS — G43.101 MIGRAINE WITH AURA AND WITH STATUS MIGRAINOSUS, NOT INTRACTABLE: Primary | ICD-10-CM

## 2022-06-09 PROCEDURE — 99213 OFFICE O/P EST LOW 20 MIN: CPT | Performed by: PHYSICIAN ASSISTANT

## 2022-06-09 PROCEDURE — G8419 CALC BMI OUT NRM PARAM NOF/U: HCPCS | Performed by: PHYSICIAN ASSISTANT

## 2022-06-09 PROCEDURE — 1036F TOBACCO NON-USER: CPT | Performed by: PHYSICIAN ASSISTANT

## 2022-06-09 PROCEDURE — G8427 DOCREV CUR MEDS BY ELIG CLIN: HCPCS | Performed by: PHYSICIAN ASSISTANT

## 2022-06-09 RX ORDER — RIMEGEPANT SULFATE 75 MG/75MG
75 TABLET, ORALLY DISINTEGRATING ORAL DAILY PRN
Qty: 15 TABLET | Refills: 0 | Status: SHIPPED | OUTPATIENT
Start: 2022-06-09 | End: 2022-06-10 | Stop reason: CLARIF

## 2022-06-09 RX ORDER — TOPIRAMATE 25 MG/1
TABLET ORAL
Qty: 60 TABLET | Refills: 1 | Status: SHIPPED | OUTPATIENT
Start: 2022-06-09 | End: 2022-07-21

## 2022-06-09 SDOH — ECONOMIC STABILITY: TRANSPORTATION INSECURITY
IN THE PAST 12 MONTHS, HAS LACK OF TRANSPORTATION KEPT YOU FROM MEETINGS, WORK, OR FROM GETTING THINGS NEEDED FOR DAILY LIVING?: NO

## 2022-06-09 SDOH — ECONOMIC STABILITY: FOOD INSECURITY: WITHIN THE PAST 12 MONTHS, YOU WORRIED THAT YOUR FOOD WOULD RUN OUT BEFORE YOU GOT MONEY TO BUY MORE.: NEVER TRUE

## 2022-06-09 SDOH — ECONOMIC STABILITY: TRANSPORTATION INSECURITY
IN THE PAST 12 MONTHS, HAS THE LACK OF TRANSPORTATION KEPT YOU FROM MEDICAL APPOINTMENTS OR FROM GETTING MEDICATIONS?: NO

## 2022-06-09 SDOH — ECONOMIC STABILITY: FOOD INSECURITY: WITHIN THE PAST 12 MONTHS, THE FOOD YOU BOUGHT JUST DIDN'T LAST AND YOU DIDN'T HAVE MONEY TO GET MORE.: NEVER TRUE

## 2022-06-09 ASSESSMENT — SOCIAL DETERMINANTS OF HEALTH (SDOH): HOW HARD IS IT FOR YOU TO PAY FOR THE VERY BASICS LIKE FOOD, HOUSING, MEDICAL CARE, AND HEATING?: NOT HARD AT ALL

## 2022-06-09 ASSESSMENT — PATIENT HEALTH QUESTIONNAIRE - PHQ9
SUM OF ALL RESPONSES TO PHQ QUESTIONS 1-9: 0
SUM OF ALL RESPONSES TO PHQ QUESTIONS 1-9: 0
1. LITTLE INTEREST OR PLEASURE IN DOING THINGS: 0
2. FEELING DOWN, DEPRESSED OR HOPELESS: 0
SUM OF ALL RESPONSES TO PHQ QUESTIONS 1-9: 0
SUM OF ALL RESPONSES TO PHQ9 QUESTIONS 1 & 2: 0
SUM OF ALL RESPONSES TO PHQ QUESTIONS 1-9: 0

## 2022-06-09 ASSESSMENT — ENCOUNTER SYMPTOMS
RESPIRATORY NEGATIVE: 1
NAUSEA: 1
EYE WATERING: 1
BLURRED VISION: 1
PHOTOPHOBIA: 1
SCALP TENDERNESS: 1

## 2022-06-09 NOTE — PROGRESS NOTES
Subjective:      Patient ID: Mona Harrison is a 25 y.o. female. Migraine   This is a chronic problem. The current episode started more than 1 year ago. The problem has been gradually worsening. The pain quality is similar to prior headaches. Associated symptoms include blurred vision, dizziness, eye watering, nausea, phonophobia, photophobia and scalp tenderness. Pertinent negatives include no abnormal behavior. Nothing aggravates the symptoms. She has tried NSAIDs and beta blockers (Fioricet) for the symptoms. Her past medical history is significant for migraine headaches and migraines in the family. Review of Systems   Constitutional: Negative. HENT: Negative. Eyes: Positive for blurred vision and photophobia. Respiratory: Negative. Cardiovascular: Negative. Gastrointestinal: Positive for nausea. Neurological: Positive for dizziness. Objective:   Physical Exam  HENT:      Head: Normocephalic. Right Ear: Tympanic membrane normal.      Left Ear: Tympanic membrane normal.      Mouth/Throat:      Mouth: Mucous membranes are moist.   Eyes:      Pupils: Pupils are equal, round, and reactive to light. Cardiovascular:      Rate and Rhythm: Normal rate and regular rhythm. Pulmonary:      Effort: Pulmonary effort is normal.      Breath sounds: Normal breath sounds. Musculoskeletal:      Cervical back: Neck supple. Skin:     General: Skin is warm and dry. Neurological:      General: No focal deficit present. Mental Status: She is alert and oriented to person, place, and time. Cranial Nerves: No cranial nerve deficit. Psychiatric:         Mood and Affect: Mood normal.         Assessment:      1. Migraine with aura and with status migrainosus, not intractable          Plan:      Topamax with increasing dose to 50 mg bid. Stop Inderal given lack of response. Nurtec for abortive therapy. Push fluids. Monitor headache triggers. Continue migraine diary.   Follow-up in 4-6 weeks/sooner PRN.         VIVEK Jennings

## 2022-06-10 ENCOUNTER — TELEPHONE (OUTPATIENT)
Dept: FAMILY MEDICINE CLINIC | Age: 22
End: 2022-06-10

## 2022-06-10 RX ORDER — UBROGEPANT 50 MG/1
50 TABLET ORAL
Qty: 10 TABLET | Refills: 0 | Status: SHIPPED | OUTPATIENT
Start: 2022-06-10 | End: 2022-07-21 | Stop reason: SDUPTHER

## 2022-06-10 NOTE — TELEPHONE ENCOUNTER
Rimegepant Sulfate (NURTEC) 75 MG TBDP     Take 75 mg by mouth daily as needed (     This was denied

## 2022-06-17 ENCOUNTER — TELEPHONE (OUTPATIENT)
Dept: FAMILY MEDICINE CLINIC | Age: 22
End: 2022-06-17

## 2022-06-17 NOTE — TELEPHONE ENCOUNTER
Pt would like amy to call her back about her meds and would not discuss in detail about her meds whats going on.

## 2022-06-17 NOTE — TELEPHONE ENCOUNTER
Kody Martinez will only cost 2 but pharmacist says it will intefer with BCP and also will make her \" foggy or how she responds to things. States to busy and cant let this happem

## 2022-07-08 ENCOUNTER — TELEPHONE (OUTPATIENT)
Dept: FAMILY MEDICINE CLINIC | Age: 22
End: 2022-07-08

## 2022-07-21 ENCOUNTER — OFFICE VISIT (OUTPATIENT)
Dept: FAMILY MEDICINE CLINIC | Age: 22
End: 2022-07-21
Payer: COMMERCIAL

## 2022-07-21 VITALS
HEIGHT: 63 IN | WEIGHT: 155 LBS | SYSTOLIC BLOOD PRESSURE: 120 MMHG | DIASTOLIC BLOOD PRESSURE: 80 MMHG | HEART RATE: 68 BPM | BODY MASS INDEX: 27.46 KG/M2

## 2022-07-21 DIAGNOSIS — G43.101 MIGRAINE WITH AURA AND WITH STATUS MIGRAINOSUS, NOT INTRACTABLE: Primary | ICD-10-CM

## 2022-07-21 PROCEDURE — G8427 DOCREV CUR MEDS BY ELIG CLIN: HCPCS | Performed by: PHYSICIAN ASSISTANT

## 2022-07-21 PROCEDURE — G8419 CALC BMI OUT NRM PARAM NOF/U: HCPCS | Performed by: PHYSICIAN ASSISTANT

## 2022-07-21 PROCEDURE — 99213 OFFICE O/P EST LOW 20 MIN: CPT | Performed by: PHYSICIAN ASSISTANT

## 2022-07-21 PROCEDURE — 1036F TOBACCO NON-USER: CPT | Performed by: PHYSICIAN ASSISTANT

## 2022-07-21 RX ORDER — ATOGEPANT 10 MG/1
10 TABLET ORAL DAILY
Qty: 30 TABLET | Refills: 3 | Status: SHIPPED | OUTPATIENT
Start: 2022-07-21 | End: 2022-09-15 | Stop reason: DRUGHIGH

## 2022-07-21 RX ORDER — UBROGEPANT 50 MG/1
50 TABLET ORAL
Qty: 10 TABLET | Refills: 0 | Status: SHIPPED | OUTPATIENT
Start: 2022-07-21 | End: 2022-07-22 | Stop reason: SDUPTHER

## 2022-07-21 ASSESSMENT — ENCOUNTER SYMPTOMS
RESPIRATORY NEGATIVE: 1
BACK PAIN: 0
NAUSEA: 1
BLURRED VISION: 1
PHOTOPHOBIA: 1

## 2022-07-22 ENCOUNTER — TELEPHONE (OUTPATIENT)
Dept: FAMILY MEDICINE CLINIC | Age: 22
End: 2022-07-22

## 2022-07-22 DIAGNOSIS — G43.101 MIGRAINE WITH AURA AND WITH STATUS MIGRAINOSUS, NOT INTRACTABLE: ICD-10-CM

## 2022-07-22 RX ORDER — UBROGEPANT 50 MG/1
50 TABLET ORAL
Qty: 16 TABLET | Refills: 0 | Status: SHIPPED | OUTPATIENT
Start: 2022-07-22 | End: 2022-07-22

## 2022-07-22 NOTE — PROGRESS NOTES
Subjective:      Patient ID: Petty Lopez is a 25 y.o. female. Migraine   This is a chronic problem. The current episode started more than 1 month ago. The problem has been gradually worsening. The pain quality is similar to prior headaches. The quality of the pain is described as aching. Associated symptoms include blurred vision, nausea, phonophobia, photophobia and weakness. Pertinent negatives include no abnormal behavior, back pain or numbness. She has tried ketorolac injections (inderal, Topamax) for the symptoms. The treatment provided mild relief. Her past medical history is significant for migraines in the family. Review of Systems   Constitutional: Negative. HENT: Negative. Eyes:  Positive for blurred vision and photophobia. Respiratory: Negative. Cardiovascular: Negative. Gastrointestinal:  Positive for nausea. Musculoskeletal:  Negative for back pain. Neurological:  Positive for weakness. Negative for numbness. Objective:   Physical Exam  HENT:      Head: Normocephalic. Right Ear: Tympanic membrane normal.      Left Ear: Tympanic membrane normal.      Nose: Nose normal.   Cardiovascular:      Rate and Rhythm: Normal rate. Pulmonary:      Effort: Pulmonary effort is normal.      Breath sounds: Normal breath sounds. Musculoskeletal:      Cervical back: Neck supple. Skin:     General: Skin is warm and dry. Neurological:      General: No focal deficit present. Mental Status: She is alert and oriented to person, place, and time. Cranial Nerves: No cranial nerve deficit. Psychiatric:         Mood and Affect: Mood normal.       Assessment:      1. Migraine with aura and with status migrainosus, not intractable          Plan:      Discontinue Topamax secondary lack of response. MRI brain. Referral to neurology. Qulipta 10 mg daily. Ubrelvy 50 mg PRN acute headache. Monitor headache triggers. Follow-up in 3-4 weeks/sooner PRN.         Erin Rizzo VIVEK CASE

## 2022-07-26 ENCOUNTER — TELEPHONE (OUTPATIENT)
Dept: FAMILY MEDICINE CLINIC | Age: 22
End: 2022-07-26

## 2022-07-26 NOTE — TELEPHONE ENCOUNTER
PA required. Spoke with Carola Puentes Fought with Мария John. Because patient has failed imitrex or maxalt, he feels she should get approved with the PA.

## 2022-07-27 ENCOUNTER — TELEPHONE (OUTPATIENT)
Dept: FAMILY MEDICINE CLINIC | Age: 22
End: 2022-07-27

## 2022-07-27 NOTE — TELEPHONE ENCOUNTER
Brenda with [facility] called regarding pts prior auth denial. Patient qualifies for [facility]. Forms sent over and placed on [employer]. Once signed and sent back, Les Elizabeth will reach out to patient to set up delivey.  Brenda's # [phone]

## 2022-07-28 NOTE — TELEPHONE ENCOUNTER
Catalina Meraz called back and was able to get it all squared away at the pharmacy. She will be receiving her Tenbrandon Finer updated as well.

## 2022-07-28 NOTE — TELEPHONE ENCOUNTER
Magi Segal, drug rep, stopped by [facility] pharmacy because they called back and said that the coupon card wasn't going to work unless [employer] covered some of the medication. Magi Segal discovered while there that [facility] has patient with a secondary government type insurance and that was the problem with not being able to get the coupon card to work. I called patient, she denies a secondary govt insurance. [facility] this.

## 2022-07-28 NOTE — TELEPHONE ENCOUNTER
Felicitas bucio brought lunch to the office today. She followed up with me since speaking with Everette earlier in the week. Informed Meghana Esposito the PA was denied. She suggested calling [facility] pharmacy with this information and they should provide a 6 month copay card. Called [facility], spoke with Martine. She informed me they sent it to the [address] [place] [address]. I called [facility] and spoke with [facility]. She stated they did not have. Called [facility] back and informed [facility] did not have it. She was going to call [facility] and inform them where to find the copay card. Patient should be able to get Ubrelvy for 6 months free of charge now.

## 2022-08-02 ENCOUNTER — TELEPHONE (OUTPATIENT)
Dept: FAMILY MEDICINE CLINIC | Age: 22
End: 2022-08-02

## 2022-08-02 ENCOUNTER — HOSPITAL ENCOUNTER (OUTPATIENT)
Dept: MRI IMAGING | Age: 22
Discharge: HOME | End: 2022-08-04
Payer: COMMERCIAL

## 2022-08-02 DIAGNOSIS — G43.101 MIGRAINE WITH AURA AND WITH STATUS MIGRAINOSUS, NOT INTRACTABLE: ICD-10-CM

## 2022-08-02 PROCEDURE — 70553 MRI BRAIN STEM W/O & W/DYE: CPT

## 2022-08-02 PROCEDURE — A9579 GAD-BASE MR CONTRAST NOS,1ML: HCPCS | Performed by: PHYSICIAN ASSISTANT

## 2022-08-02 PROCEDURE — 6360000004 HC RX CONTRAST MEDICATION: Performed by: PHYSICIAN ASSISTANT

## 2022-08-02 RX ADMIN — GADOTERIDOL 15 ML: 279.3 INJECTION, SOLUTION INTRAVENOUS at 10:52

## 2022-08-20 DIAGNOSIS — G43.829 MENSTRUAL MIGRAINE WITHOUT STATUS MIGRAINOSUS, NOT INTRACTABLE: ICD-10-CM

## 2022-08-20 DIAGNOSIS — N94.6 DYSMENORRHEA: ICD-10-CM

## 2022-08-22 RX ORDER — NORETHINDRONE ACETATE AND ETHINYL ESTRADIOL 1MG-20(21)
KIT ORAL
Qty: 28 TABLET | Refills: 0 | Status: SHIPPED | OUTPATIENT
Start: 2022-08-22 | End: 2022-09-19

## 2022-08-22 NOTE — TELEPHONE ENCOUNTER
Last Appt:  7/21/2022  Next Appt:   9/15/2022  Med verified in [facility]

## 2022-09-15 ENCOUNTER — OFFICE VISIT (OUTPATIENT)
Dept: FAMILY MEDICINE CLINIC | Age: 22
End: 2022-09-15
Payer: COMMERCIAL

## 2022-09-15 VITALS
DIASTOLIC BLOOD PRESSURE: 70 MMHG | BODY MASS INDEX: 27.11 KG/M2 | HEIGHT: 63 IN | OXYGEN SATURATION: 99 % | WEIGHT: 153 LBS | HEART RATE: 87 BPM | SYSTOLIC BLOOD PRESSURE: 112 MMHG

## 2022-09-15 DIAGNOSIS — G43.101 MIGRAINE WITH AURA AND WITH STATUS MIGRAINOSUS, NOT INTRACTABLE: ICD-10-CM

## 2022-09-15 PROCEDURE — 1036F TOBACCO NON-USER: CPT | Performed by: PHYSICIAN ASSISTANT

## 2022-09-15 PROCEDURE — G8427 DOCREV CUR MEDS BY ELIG CLIN: HCPCS | Performed by: PHYSICIAN ASSISTANT

## 2022-09-15 PROCEDURE — 99213 OFFICE O/P EST LOW 20 MIN: CPT | Performed by: PHYSICIAN ASSISTANT

## 2022-09-15 PROCEDURE — G8419 CALC BMI OUT NRM PARAM NOF/U: HCPCS | Performed by: PHYSICIAN ASSISTANT

## 2022-09-15 RX ORDER — ATOGEPANT 30 MG/1
30 TABLET ORAL DAILY
Qty: 30 TABLET | Refills: 6 | Status: CANCELLED | OUTPATIENT
Start: 2022-09-15

## 2022-09-15 RX ORDER — ATOGEPANT 30 MG/1
30 TABLET ORAL DAILY
Qty: 30 TABLET | Refills: 6 | Status: SHIPPED | OUTPATIENT
Start: 2022-09-15 | End: 2022-10-18 | Stop reason: SDUPTHER

## 2022-09-15 RX ORDER — UBROGEPANT 50 MG/1
TABLET ORAL
COMMUNITY
Start: 2022-07-28

## 2022-09-15 ASSESSMENT — ENCOUNTER SYMPTOMS
NAUSEA: 1
RESPIRATORY NEGATIVE: 1
PHOTOPHOBIA: 1
BLURRED VISION: 1

## 2022-09-15 NOTE — PROGRESS NOTES
Subjective:      Patient ID: Destiny Garcia is a 25 y.o. female. Migraine   This is a chronic problem. The current episode started more than 1 month ago. The problem has been gradually improving. The pain quality is similar to prior headaches. Associated symptoms include blurred vision, nausea, phonophobia and photophobia. Pertinent negatives include no abnormal behavior. The symptoms are aggravated by unknown. She has tried triptans, Excedrin and beta blockers (CGRP antagonist) for the symptoms. The treatment provided moderate relief. Her past medical history is significant for migraine headaches. Review of Systems   Constitutional: Negative. HENT: Negative. Eyes:  Positive for blurred vision and photophobia. Respiratory: Negative. Cardiovascular: Negative. Gastrointestinal:  Positive for nausea. Objective:   Physical Exam  HENT:      Head: Normocephalic. Right Ear: Tympanic membrane normal.   Eyes:      Pupils: Pupils are equal, round, and reactive to light. Cardiovascular:      Rate and Rhythm: Normal rate. Pulmonary:      Effort: Pulmonary effort is normal.      Breath sounds: Normal breath sounds. Neurological:      General: No focal deficit present. Mental Status: She is alert and oriented to person, place, and time. Psychiatric:         Mood and Affect: Mood normal.       Assessment:      1. Migraine with aura and with status migrainosus, not intractable          Plan:      MRI brain reviewed. Increase Qulipta 30 mg daily. Continue Ubrelvy PRN breakthrough headache. Continue to monitor migraine triggers. Supportive care advised. Follow-up in three months/sooner PRN.         VIVEK Fregoso

## 2022-09-18 DIAGNOSIS — N94.6 DYSMENORRHEA: ICD-10-CM

## 2022-09-18 DIAGNOSIS — G43.829 MENSTRUAL MIGRAINE WITHOUT STATUS MIGRAINOSUS, NOT INTRACTABLE: ICD-10-CM

## 2022-09-19 RX ORDER — NORETHINDRONE ACETATE AND ETHINYL ESTRADIOL 1MG-20(21)
KIT ORAL
Qty: 28 TABLET | Refills: 11 | Status: SHIPPED | OUTPATIENT
Start: 2022-09-19

## 2022-09-19 NOTE — TELEPHONE ENCOUNTER
Last Appt:  9/15/2022  Next Appt:   12/19/2022  Med verified in [facility]

## 2022-10-18 ENCOUNTER — OFFICE VISIT (OUTPATIENT)
Dept: NEUROLOGY | Age: 22
End: 2022-10-18
Payer: COMMERCIAL

## 2022-10-18 VITALS
DIASTOLIC BLOOD PRESSURE: 81 MMHG | BODY MASS INDEX: 27.29 KG/M2 | SYSTOLIC BLOOD PRESSURE: 120 MMHG | HEIGHT: 63 IN | HEART RATE: 82 BPM | WEIGHT: 154 LBS

## 2022-10-18 DIAGNOSIS — G43.101 MIGRAINE WITH AURA AND WITH STATUS MIGRAINOSUS, NOT INTRACTABLE: ICD-10-CM

## 2022-10-18 PROCEDURE — 1036F TOBACCO NON-USER: CPT | Performed by: STUDENT IN AN ORGANIZED HEALTH CARE EDUCATION/TRAINING PROGRAM

## 2022-10-18 PROCEDURE — G8484 FLU IMMUNIZE NO ADMIN: HCPCS | Performed by: STUDENT IN AN ORGANIZED HEALTH CARE EDUCATION/TRAINING PROGRAM

## 2022-10-18 PROCEDURE — G8427 DOCREV CUR MEDS BY ELIG CLIN: HCPCS | Performed by: STUDENT IN AN ORGANIZED HEALTH CARE EDUCATION/TRAINING PROGRAM

## 2022-10-18 PROCEDURE — G8419 CALC BMI OUT NRM PARAM NOF/U: HCPCS | Performed by: STUDENT IN AN ORGANIZED HEALTH CARE EDUCATION/TRAINING PROGRAM

## 2022-10-18 PROCEDURE — 99204 OFFICE O/P NEW MOD 45 MIN: CPT | Performed by: STUDENT IN AN ORGANIZED HEALTH CARE EDUCATION/TRAINING PROGRAM

## 2022-10-18 RX ORDER — LANOLIN ALCOHOL/MO/W.PET/CERES
400 CREAM (GRAM) TOPICAL DAILY
Qty: 30 TABLET | Refills: 2 | Status: SHIPPED | OUTPATIENT
Start: 2022-10-18

## 2022-10-18 RX ORDER — ATOGEPANT 30 MG/1
60 TABLET ORAL DAILY
Qty: 30 TABLET | Refills: 6 | Status: SHIPPED | OUTPATIENT
Start: 2022-10-18

## 2022-10-18 RX ORDER — RIMEGEPANT SULFATE 75 MG/75MG
75 TABLET, ORALLY DISINTEGRATING ORAL PRN
Qty: 8 TABLET | Refills: 3 | Status: SHIPPED | OUTPATIENT
Start: 2022-10-18

## 2022-10-18 NOTE — PROGRESS NOTES
[facility] [facility] [address]  [address] [address]  Dept: [phone]    PATIENT NAME: Sixto Flores  PATIENT MRN: [id number]  PRIMARY CARE PHYSICIAN: VIVEK Bunn    HPI:      Sixto Flores is a 25 y.o. female who presents to clinic today for evaluation of Migraine     She is currently taking Qulipta 30 mg daily for the past few months. She takes [place] for aboritve therapy and notes it is not effective. She notes it prolongs her headaches. Headache History:  Headaches started at menarche at age 16-14. The pain is predominantly located in left occipital area region and radiate to the frontal region. Usually the headache is preceded by an aura (\"tv fuzz\")  Currently, headaches are occuring once a week (they were occurring daiy )  Patient describes the quality of pain as burning pain, sharp pain which varies in intensity 10/10. Lavanda Candle Even when she does not have a migraine she still has the burning pain. Associated symptoms include  nausea/vomiting, photophobia, phonophobia, visual change, and paresthesias in left and right upper extremity as well as face. Denies any other symptomsNo known headache triggers. History of:  Family history of headaches or migraines: yes - mother  Head/Neck Trauma: no  Stressors: no  Sleep:             Difficulty in initiating or maintaining sleep: no,              Snoring: yes       Headache Medications  Current abortive meds: ubrelvy  Current prophylactic meds: [place]  Previous abortive medications tried: imitrex, nurtec, fioricet  Previous prophylactic medications tried: propanolol, topamax (not effective)    Previous Workup:  MRI Brain: 8/2/22  Normal mri                                                                                 PREVIOUS WORKUP:     Past Medical History:   Diagnosis Date    Asthma     Headache         History reviewed. No pertinent surgical history.      Social History     Socioeconomic History    Marital status: Single     Spouse name: Not on file    Number of children: Not on file    Years of education: Not on file    Highest education level: Not on file   Occupational History    Not on file   Tobacco Use    Smoking status: Never    Smokeless tobacco: Never   Vaping Use    Vaping Use: Never used   Substance and Sexual Activity    Alcohol use: No    Drug use: No    Sexual activity: Not on file   Other Topics Concern    Not on file   Social History Narrative    Not on file     Social Determinants of Health     Financial Resource Strain: Low Risk     Difficulty of Paying Living Expenses: Not hard at all   Food Insecurity: No Food Insecurity    Worried About Running Out of Food in the Last Year: Never true    [address] in the Last Year: Never true   Transportation Needs: No Transportation Needs    Lack of Transportation (Medical): No    Lack of Transportation (Non-Medical): No   Physical Activity: Not on file   Stress: Not on file   Social Connections: Not on file   Intimate Partner Violence: Not on file   Housing Stability: Not on file        Current Outpatient Medications   Medication Sig Dispense Refill    Atogepant (QULIPTA) 30 MG TABS Take 60 mg by mouth daily 30 tablet 6    Rimegepant Sulfate (NURTEC) 75 MG TBDP Take 75 mg by mouth as needed (headache) 8 tablet 3    magnesium oxide (MAG-OX) 400 (240 Mg) MG tablet Take 1 tablet by mouth daily 30 tablet 2    norethindrone-ethinyl estradiol (BLISOVI FE 1/20) 1-20 MG-MCG per tablet TAKE 1 TABLET BY MOUTH EVERY DAY 28 tablet 11    UBRELVY 50 MG TABS TAKE 1 TABLET BY MOUTH ONCE AS NEEDED FOR HEADACHE      fluticasone (FLONASE) 50 MCG/ACT nasal spray 1 spray by Each Nostril route 2 times daily 16 g 0    montelukast (SINGULAIR) 10 MG tablet Take 1 tablet by mouth nightly 30 tablet 11     No current facility-administered medications for this visit.         Allergies   Allergen Reactions    Seasonal     Imitrex [Sumatriptan] Nausea And Vomiting        REVIEW OF SYSTEMS:     Review of Systems       NEUROLOGICAL EXAMINATION:   VITALS  /81 (Site: Left Upper Arm, Position: Sitting, Cuff Size: Medium Adult)   Pulse 82   Ht 5' 3\" (1.6 m)   Wt 154 lb (69.9 kg)   BMI 27.28 kg/m²      Mental status    Alert and oriented x 3; intact memory with no confusion, speech or language problems; no hallucinations or delusions     Cranial nerves    II - visual fields intact to confrontation  III, IV, VI - extra-ocular muscles full: no pupillary defect; no IZZY, no nystagmus, no ptosis   V - normal facial sensation                                                               VII - normal facial symmetry                                                             VIII - intact hearing                                                                             IX, X - symmetrical palate                                                                  XI - symmetrical shoulder shrug                                                       XII - tongue midline without atrophy or fasciculation      Motor function  Normal muscle bulk and tone; strength 5/5 on all 4 extremities, no pronator drift      Sensory function Intact to light touch, pinprick, vibration, proprioception on all 4 extremities      Cerebellar Intact fine motor movement. No involuntary movements or tremors. No ataxia or dysmetria on finger to nose or heel to shin testing      Reflex function DTR 2+ on bilateral UE and LE, symmetric. Gait                   normal base and arm swing        ASSESSMENT / PLAN:   Geronimo David is a 25 y.o. female that established care with neurology for Migraine with aura. Previously been on propanolol and Topamax for migraine prophylaxis and were not effective. She is currently on Quilipta 30 mg and notes she still has frequent headaches. Discussed increasing Qulipta to 60 mg daily and will switch Ubrelvy to [employer] as it was more effective than Ubrelvy.     Plan:  -Increase Arleene Beech to 60 mg daily  -Nurtec for abortive therapy, discussed can combine with tylenol  -Start magnesium oxide for migraine prophylaxis      Eben Simon MD   Neurology & Sleep Medicine  [facility]

## 2022-10-27 ENCOUNTER — TELEPHONE (OUTPATIENT)
Dept: NEUROLOGY | Age: 22
End: 2022-10-27

## 2022-10-27 NOTE — LETTER
TESSA [address]ques [address]  [address] [facility], [address] [facility]    11/8/2022    Member ID: [id number]  Case Number: [id number]    [facility]  aWli Echols MD    [address] [address]  2000    To Whom it May Concern,   please reconsider approval for [place] that was previously denied. This patient has tried and failed multiple medications including but not limited to:  Current abortive meds: ubrelvy  Current prophylactic meds: [place] (not effective)  Previous abortive medications tried: imitrex, nurtec, fioricet  Previous prophylactic medications tried: propanolol, topamax (not effective). Patient also reports:   Headaches started at menarche at age 16-14. The pain is predominantly located in left occipital area region and radiate to the frontal region. Usually the headache is preceded by an aura (\"tv fuzz\")  Currently, headaches are occuring once a week (they were occurring daiy)  Patient describes the quality of pain as burning pain, sharp pain which varies in intensity 10/10. Pretty Balling Even when she does not have a migraine she still has the burning pain.     Associated symptoms include  nausea/vomiting, photophobia, phonophobia, visual change, and paresthesias in left and right upper extremity as well as face. Denies any other symptomsNo known headache triggers. \" Thank you for your time and consideration.     Please call with any further questions,  Lizbeth Matamoros LPN for  Wali Echols MD

## 2022-12-19 ENCOUNTER — OFFICE VISIT (OUTPATIENT)
Dept: FAMILY MEDICINE CLINIC | Age: 22
End: 2022-12-19
Payer: COMMERCIAL

## 2022-12-19 VITALS
WEIGHT: 154 LBS | HEIGHT: 63 IN | SYSTOLIC BLOOD PRESSURE: 106 MMHG | DIASTOLIC BLOOD PRESSURE: 68 MMHG | OXYGEN SATURATION: 100 % | BODY MASS INDEX: 27.29 KG/M2 | HEART RATE: 99 BPM

## 2022-12-19 DIAGNOSIS — G43.101 MIGRAINE WITH AURA AND WITH STATUS MIGRAINOSUS, NOT INTRACTABLE: Primary | ICD-10-CM

## 2022-12-19 PROCEDURE — G8419 CALC BMI OUT NRM PARAM NOF/U: HCPCS | Performed by: PHYSICIAN ASSISTANT

## 2022-12-19 PROCEDURE — G8427 DOCREV CUR MEDS BY ELIG CLIN: HCPCS | Performed by: PHYSICIAN ASSISTANT

## 2022-12-19 PROCEDURE — 1036F TOBACCO NON-USER: CPT | Performed by: PHYSICIAN ASSISTANT

## 2022-12-19 PROCEDURE — G8484 FLU IMMUNIZE NO ADMIN: HCPCS | Performed by: PHYSICIAN ASSISTANT

## 2022-12-19 PROCEDURE — 99213 OFFICE O/P EST LOW 20 MIN: CPT | Performed by: PHYSICIAN ASSISTANT

## 2022-12-19 ASSESSMENT — ENCOUNTER SYMPTOMS
VOMITING: 0
ABDOMINAL PAIN: 0
SCALP TENDERNESS: 0
FACIAL SWEATING: 0
RESPIRATORY NEGATIVE: 1
BLURRED VISION: 0

## 2022-12-19 NOTE — PROGRESS NOTES
Subjective:      Patient ID: Tushar Sagastume is a 25 y.o. female. Migraine   The current episode started more than 1 year ago. The problem has been gradually improving. The pain radiates to the face. The pain quality is similar to prior headaches. Pertinent negatives include no abdominal pain, abnormal behavior, blurred vision, facial sweating, loss of balance, numbness, scalp tenderness, tingling or vomiting. Treatments tried: [place]. The treatment provided moderate relief. Her past medical history is significant for migraine headaches and migraines in the family. Review of Systems   Constitutional: Negative. HENT: Negative. Eyes:  Negative for blurred vision. Respiratory: Negative. Cardiovascular: Negative. Gastrointestinal:  Negative for abdominal pain and vomiting. Neurological:  Negative for tingling, numbness and loss of balance. Objective:   Physical Exam  HENT:      Head: Normocephalic. Right Ear: Tympanic membrane normal.      Left Ear: Tympanic membrane normal.      Mouth/Throat:      Mouth: Mucous membranes are moist.   Eyes:      Pupils: Pupils are equal, round, and reactive to light. Cardiovascular:      Rate and Rhythm: Normal rate. Pulmonary:      Effort: Pulmonary effort is normal.      Breath sounds: Normal breath sounds. Skin:     General: Skin is warm and dry. Neurological:      General: No focal deficit present. Mental Status: She is alert and oriented to person, place, and time. Psychiatric:         Mood and Affect: Mood normal.       Assessment:      1. Migraine with aura and with status migrainosus, not intractable          Plan:      Patient has seen neurology. CharLincoln Ohms was increased which has been helpful. Nurtec was denied by insurance. Patient continues to use Ubrelvy with marginal improvements. Continue to follow with neurology. Healthy diet and routine exercise. Follow-up in six months/sooner PRN.         Yovana Landry PA

## 2023-01-04 ENCOUNTER — OFFICE VISIT (OUTPATIENT)
Dept: FAMILY MEDICINE CLINIC | Age: 23
End: 2023-01-04
Payer: COMMERCIAL

## 2023-01-04 VITALS — WEIGHT: 150 LBS | RESPIRATION RATE: 16 BRPM | TEMPERATURE: 100.3 F | BODY MASS INDEX: 26.58 KG/M2 | HEIGHT: 63 IN

## 2023-01-04 DIAGNOSIS — J06.9 VIRAL URI: Primary | ICD-10-CM

## 2023-01-04 PROCEDURE — 99213 OFFICE O/P EST LOW 20 MIN: CPT | Performed by: STUDENT IN AN ORGANIZED HEALTH CARE EDUCATION/TRAINING PROGRAM

## 2023-01-04 PROCEDURE — G8419 CALC BMI OUT NRM PARAM NOF/U: HCPCS | Performed by: STUDENT IN AN ORGANIZED HEALTH CARE EDUCATION/TRAINING PROGRAM

## 2023-01-04 PROCEDURE — 1036F TOBACCO NON-USER: CPT | Performed by: STUDENT IN AN ORGANIZED HEALTH CARE EDUCATION/TRAINING PROGRAM

## 2023-01-04 PROCEDURE — G8427 DOCREV CUR MEDS BY ELIG CLIN: HCPCS | Performed by: STUDENT IN AN ORGANIZED HEALTH CARE EDUCATION/TRAINING PROGRAM

## 2023-01-04 PROCEDURE — G8484 FLU IMMUNIZE NO ADMIN: HCPCS | Performed by: STUDENT IN AN ORGANIZED HEALTH CARE EDUCATION/TRAINING PROGRAM

## 2023-01-04 RX ORDER — LORATADINE AND PSEUDOEPHEDRINE SULFATE 5; 120 MG/1; MG/1
1 TABLET, EXTENDED RELEASE ORAL 2 TIMES DAILY
Qty: 20 TABLET | Refills: 0 | Status: SHIPPED | OUTPATIENT
Start: 2023-01-04

## 2023-01-04 RX ORDER — IBUPROFEN 600 MG/1
600 TABLET ORAL
Qty: 30 TABLET | Refills: 0 | Status: SHIPPED | OUTPATIENT
Start: 2023-01-04

## 2023-01-04 NOTE — PROGRESS NOTES
TESSA [address]  [address] [place] [address]  Dept: [phone]  Dept Fax: [phone]  Loc: [phone]      Alice Ramirez is a 25 y.o. female who presents today for:  Chief Complaint   Patient presents with    Pharyngitis    Otalgia     Both ears. Started yesterday. OTC meds mucinex X1 day. Ibuprofen taken. Fever     100         Goals    None         HPI:     HPI  Patient is a 45-year-old female who presents to the office today for a sick visit. Patient states that she has been dealing with a sore throat, fever, earache for the past 1 to 2 days. Unsure of any sick contacts. Has tried Mucinex with some mild improvement in her symptoms. Wanted to make sure that she did not have an ear infection or any other signs of infection at this time. Denies any shortness of breath, chest pain, wheezing, other systemic symptoms at this time. States that the sore throat and the body aches are also one of her biggest concerns at this time. Has tried ibuprofen for this with some mild improvement. Past Medical History:   Diagnosis Date    Asthma     Headache       No past surgical history on file.   Family History   Problem Relation Age of Onset    Asthma Mother     Other Mother         migranes    Heart Disease Maternal Grandfather         balloon surgery    Hypertension Maternal Grandfather     Kidney Disease Maternal Grandfather         stones    High Cholesterol Maternal Grandfather     Cancer Paternal Grandfather         leukemia    Asthma Maternal Aunt     Diabetes Maternal Grandmother      Social History     Tobacco Use    Smoking status: Never    Smokeless tobacco: Never   Substance Use Topics    Alcohol use: No      Current Outpatient Medications   Medication Sig Dispense Refill    loratadine-pseudoephedrine (CLARITIN-D 12 HOUR) 5-120 MG per extended release tablet Take 1 tablet by mouth 2 times daily 20 tablet 0    ibuprofen (ADVIL;MOTRIN) 600 MG tablet Take 1 tablet by mouth 3 times daily (with meals) 30 tablet 0    Atogepant (QULIPTA) 30 MG TABS Take 60 mg by mouth daily 30 tablet 6    Rimegepant Sulfate (NURTEC) 75 MG TBDP Take 75 mg by mouth as needed (headache) 8 tablet 3    magnesium oxide (MAG-OX) 400 (240 Mg) MG tablet Take 1 tablet by mouth daily 30 tablet 2    norethindrone-ethinyl estradiol (BLISOVI FE 1/20) 1-20 MG-MCG per tablet TAKE 1 TABLET BY MOUTH EVERY DAY 28 tablet 11    UBRELVY 50 MG TABS TAKE 1 TABLET BY MOUTH ONCE AS NEEDED FOR HEADACHE      fluticasone (FLONASE) 50 MCG/ACT nasal spray 1 spray by Each Nostril route 2 times daily 16 g 0    montelukast (SINGULAIR) 10 MG tablet Take 1 tablet by mouth nightly 30 tablet 11     No current facility-administered medications for this visit. Allergies   Allergen Reactions    Seasonal     Imitrex [Sumatriptan] Nausea And Vomiting       Health Maintenance   Topic Date Due    COVID-19 Vaccine (1) Never done    HPV vaccine (1 - 2-dose series) Never done    Chlamydia/GC screen  Never done    Pap smear  Never done    Flu vaccine (1) Never done    Depression Screen  06/09/2023    DTaP/Tdap/Td vaccine (7 - Td or Tdap) 10/24/2023    Hib vaccine  Completed    Varicella vaccine  Completed    Meningococcal (ACWY) vaccine  Completed    Hepatitis A vaccine  Aged Out    Pneumococcal 0-64 years Vaccine  Aged Out    Hepatitis C screen  Discontinued    HIV screen  Discontinued       Subjective:      Review of Systems   Constitutional:  Positive for chills, fatigue and fever. HENT:  Positive for congestion, ear pain, rhinorrhea and sore throat. Negative for postnasal drip and trouble swallowing. Eyes:  Negative for pain and visual disturbance. Respiratory:  Positive for cough. Negative for shortness of breath. Cardiovascular:  Negative for chest pain and palpitations.    Gastrointestinal:  Negative for abdominal pain, blood in stool, constipation, diarrhea, nausea and vomiting. Genitourinary:  Negative for dysuria and urgency. Skin:  Negative for rash and wound. Neurological:  Negative for dizziness and headaches. Psychiatric/Behavioral:  Negative for dysphoric mood. The patient is not nervous/anxious. Objective:     Vitals:    01/04/23 1614   Resp: 16   Temp: 100.3 °F (37.9 °C)   Weight: 150 lb (68 kg)   Height: 5' 3\" (1.6 m)       Body mass index is 26.57 kg/m². Wt Readings from Last 3 Encounters:   01/04/23 150 lb (68 kg)   12/19/22 154 lb (69.9 kg)   10/18/22 154 lb (69.9 kg)     BP Readings from Last 3 Encounters:   12/19/22 106/68   10/18/22 120/81   09/15/22 112/70       Physical Exam  Vitals and nursing note reviewed. Constitutional:       General: She is not in acute distress. Appearance: She is well-developed. She is not diaphoretic. HENT:      Head: Normocephalic and atraumatic. Right Ear: Tympanic membrane and external ear normal.      Left Ear: Tympanic membrane and external ear normal.      Nose: Congestion and rhinorrhea present. Mouth/Throat:      Pharynx: Posterior oropharyngeal erythema present. No oropharyngeal exudate. Eyes:      General: No scleral icterus. Right eye: No discharge. Left eye: No discharge. Conjunctiva/sclera: Conjunctivae normal.   Cardiovascular:      Rate and Rhythm: Normal rate and regular rhythm. Heart sounds: Normal heart sounds. No murmur heard. Pulmonary:      Effort: Pulmonary effort is normal.      Breath sounds: Normal breath sounds. Musculoskeletal:      Cervical back: Normal range of motion. Skin:     General: Skin is warm and dry. Findings: No erythema or rash. Neurological:      Mental Status: She is alert and oriented to person, place, and time. Cranial Nerves: No cranial nerve deficit. Psychiatric:         Behavior: Behavior normal.         Thought Content:  Thought content normal.         Judgment: Judgment normal.       Lab Results Component Value Date    CHOL 172 04/16/2021    TRIG 93 04/16/2021    HDL 82 04/16/2021    AST 19 04/16/2021     04/16/2021    K 4.3 04/16/2021     04/16/2021    CREATININE 0.72 04/16/2021    BUN 13 04/16/2021    CO2 28 04/16/2021    LABA1C 5.1 04/16/2021    LABGLOM >60 04/16/2021    CALCIUM 9.9 04/16/2021       Imaging Results:    No results found. Assessment/Plan:     Nelson Reilly was seen today for pharyngitis, otalgia and fever. Diagnoses and all orders for this visit:    Viral URI  -     loratadine-pseudoephedrine (CLARITIN-D 12 HOUR) 5-120 MG per extended release tablet; Take 1 tablet by mouth 2 times daily  -     ibuprofen (ADVIL;MOTRIN) 600 MG tablet; Take 1 tablet by mouth 3 times daily (with meals)    Likely viral URI at this time we will hold off on any further viral testing at patient's request.  We will treat with Claritin-D for her congestion and sinus pressure. We will also give ibuprofen 600 mg as she may take for her sore throat and body aches. Recommend pushing oral fluids is much as possible. Recommend follow-up as needed for this issue. No follow-ups on file. Medications Prescribed:  Orders Placed This Encounter   Medications    loratadine-pseudoephedrine (CLARITIN-D 12 HOUR) 5-120 MG per extended release tablet     Sig: Take 1 tablet by mouth 2 times daily     Dispense:  20 tablet     Refill:  0    ibuprofen (ADVIL;MOTRIN) 600 MG tablet     Sig: Take 1 tablet by mouth 3 times daily (with meals)     Dispense:  30 tablet     Refill:  0         Future Appointments   Date Time Provider Piedad Ruiz   4/11/2023  9:00 AM MD Estephania Keane Neurology -   6/21/2023  8:20 [facility]       Patient given educational materials - see patient instructions. Discussed use, benefit, and sideeffects of prescribed medications. All patient questions answered. Pt voiced understanding. Reviewed health maintenance.   Instructed to continue current medications, diet and exercise. Patient agreed with treatment plan. Follow up as directed.      Electronically signed by Mckenzie Winters DO on 1/15/2023 at 9:05 PM

## 2023-01-15 ASSESSMENT — ENCOUNTER SYMPTOMS
SORE THROAT: 1
DIARRHEA: 0
SHORTNESS OF BREATH: 0
COUGH: 1
TROUBLE SWALLOWING: 0
BLOOD IN STOOL: 0
ABDOMINAL PAIN: 0
EYE PAIN: 0
NAUSEA: 0
VOMITING: 0
CONSTIPATION: 0
RHINORRHEA: 1

## 2023-04-26 ENCOUNTER — TELEPHONE (OUTPATIENT)
Dept: NEUROLOGY | Age: 23
End: 2023-04-26

## 2023-06-21 ENCOUNTER — HOSPITAL ENCOUNTER (OUTPATIENT)
Age: 23
Discharge: HOME | End: 2023-06-21
Payer: COMMERCIAL

## 2023-06-21 ENCOUNTER — OFFICE VISIT (OUTPATIENT)
Dept: FAMILY MEDICINE CLINIC | Age: 23
End: 2023-06-21
Payer: COMMERCIAL

## 2023-06-21 VITALS
HEART RATE: 80 BPM | WEIGHT: 164 LBS | DIASTOLIC BLOOD PRESSURE: 80 MMHG | HEIGHT: 63 IN | SYSTOLIC BLOOD PRESSURE: 120 MMHG | BODY MASS INDEX: 29.06 KG/M2

## 2023-06-21 DIAGNOSIS — R63.5 WEIGHT GAIN: ICD-10-CM

## 2023-06-21 DIAGNOSIS — Z00.00 ANNUAL PHYSICAL EXAM: Primary | ICD-10-CM

## 2023-06-21 DIAGNOSIS — Z00.00 ANNUAL PHYSICAL EXAM: ICD-10-CM

## 2023-06-21 DIAGNOSIS — G43.101 MIGRAINE WITH AURA AND WITH STATUS MIGRAINOSUS, NOT INTRACTABLE: ICD-10-CM

## 2023-06-21 DIAGNOSIS — Z12.4 CERVICAL CANCER SCREENING: ICD-10-CM

## 2023-06-21 LAB
ALBUMIN SERPL-MCNC: 4.5 G/DL (ref 3.5–5.2)
ALBUMIN/GLOB SERPL: 1.7 {RATIO} (ref 1–2.5)
ALP SERPL-CCNC: 46 U/L (ref 35–104)
ALT SERPL-CCNC: 15 U/L (ref 5–33)
ANION GAP SERPL CALCULATED.3IONS-SCNC: 9 MMOL/L (ref 9–17)
AST SERPL-CCNC: 16 U/L
BASOPHILS # BLD: 0.04 K/UL (ref 0–0.2)
BASOPHILS NFR BLD: 1 % (ref 0–2)
BILIRUB SERPL-MCNC: 0.6 MG/DL (ref 0.3–1.2)
BUN SERPL-MCNC: 11 MG/DL (ref 6–20)
BUN/CREAT SERPL: 14 (ref 9–20)
C PEPTIDE SERPL-MCNC: 1.9 NG/ML (ref 1.1–4.4)
CALCIUM SERPL-MCNC: 9.6 MG/DL (ref 8.6–10.4)
CHLORIDE SERPL-SCNC: 106 MMOL/L (ref 98–107)
CHOLEST SERPL-MCNC: 151 MG/DL
CHOLESTEROL/HDL RATIO: 2.1
CO2 SERPL-SCNC: 25 MMOL/L (ref 20–31)
CREAT SERPL-MCNC: 0.79 MG/DL (ref 0.5–0.9)
EOSINOPHIL # BLD: 0.33 K/UL (ref 0–0.44)
EOSINOPHILS RELATIVE PERCENT: 4 % (ref 1–4)
ERYTHROCYTE [DISTWIDTH] IN BLOOD BY AUTOMATED COUNT: 12.4 % (ref 11.8–14.4)
GFR SERPL CREATININE-BSD FRML MDRD: >60 ML/MIN/1.73M2
GLUCOSE SERPL-MCNC: 92 MG/DL (ref 70–99)
HCT VFR BLD AUTO: 39 % (ref 36.3–47.1)
HDLC SERPL-MCNC: 72 MG/DL
HGB BLD-MCNC: 13.1 G/DL (ref 11.9–15.1)
IMM GRANULOCYTES # BLD AUTO: <0.03 K/UL (ref 0–0.3)
IMM GRANULOCYTES NFR BLD: 0 %
LDLC SERPL CALC-MCNC: 59 MG/DL (ref 0–130)
LYMPHOCYTES # BLD: 36 % (ref 24–43)
LYMPHOCYTES NFR BLD: 2.67 K/UL (ref 1.1–3.7)
MCH RBC QN AUTO: 29.5 PG (ref 25.2–33.5)
MCHC RBC AUTO-ENTMCNC: 33.6 G/DL (ref 25.2–33.5)
MCV RBC AUTO: 87.8 FL (ref 82.6–102.9)
MONOCYTES NFR BLD: 0.44 K/UL (ref 0.1–1.2)
MONOCYTES NFR BLD: 6 % (ref 3–12)
NEUTROPHILS NFR BLD: 53 % (ref 36–65)
NEUTS SEG NFR BLD: 3.96 K/UL (ref 1.5–8.1)
NRBC AUTOMATED: 0 PER 100 WBC
PLATELET # BLD AUTO: 284 K/UL (ref 138–453)
PMV BLD AUTO: 9 FL (ref 8.1–13.5)
POTASSIUM SERPL-SCNC: 4.2 MMOL/L (ref 3.7–5.3)
PROT SERPL-MCNC: 7.2 G/DL (ref 6.4–8.3)
RBC # BLD AUTO: 4.44 M/UL (ref 3.95–5.11)
SODIUM SERPL-SCNC: 140 MMOL/L (ref 135–144)
TRIGL SERPL-MCNC: 98 MG/DL
TSH SERPL-MCNC: 1.83 UIU/ML (ref 0.3–5)
WBC OTHER # BLD: 7.5 K/UL (ref 3.5–11.3)

## 2023-06-21 PROCEDURE — 80061 LIPID PANEL: CPT

## 2023-06-21 PROCEDURE — 84681 ASSAY OF C-PEPTIDE: CPT

## 2023-06-21 PROCEDURE — 99395 PREV VISIT EST AGE 18-39: CPT | Performed by: PHYSICIAN ASSISTANT

## 2023-06-21 PROCEDURE — 84443 ASSAY THYROID STIM HORMONE: CPT

## 2023-06-21 PROCEDURE — 36415 COLL VENOUS BLD VENIPUNCTURE: CPT

## 2023-06-21 PROCEDURE — 85027 COMPLETE CBC AUTOMATED: CPT

## 2023-06-21 PROCEDURE — 80053 COMPREHEN METABOLIC PANEL: CPT

## 2023-06-21 RX ORDER — ATOGEPANT 60 MG/1
60 TABLET ORAL DAILY
Qty: 30 TABLET | Refills: 11 | Status: CANCELLED | OUTPATIENT
Start: 2023-06-21 | End: 2024-06-20

## 2023-06-21 SDOH — ECONOMIC STABILITY: FOOD INSECURITY: WITHIN THE PAST 12 MONTHS, YOU WORRIED THAT YOUR FOOD WOULD RUN OUT BEFORE YOU GOT MONEY TO BUY MORE.: NEVER TRUE

## 2023-06-21 SDOH — ECONOMIC STABILITY: FOOD INSECURITY: WITHIN THE PAST 12 MONTHS, THE FOOD YOU BOUGHT JUST DIDN'T LAST AND YOU DIDN'T HAVE MONEY TO GET MORE.: NEVER TRUE

## 2023-06-21 SDOH — ECONOMIC STABILITY: INCOME INSECURITY: HOW HARD IS IT FOR YOU TO PAY FOR THE VERY BASICS LIKE FOOD, HOUSING, MEDICAL CARE, AND HEATING?: NOT HARD AT ALL

## 2023-06-21 ASSESSMENT — PATIENT HEALTH QUESTIONNAIRE - PHQ9
SUM OF ALL RESPONSES TO PHQ QUESTIONS 1-9: 0
2. FEELING DOWN, DEPRESSED OR HOPELESS: 0
SUM OF ALL RESPONSES TO PHQ QUESTIONS 1-9: 0
SUM OF ALL RESPONSES TO PHQ QUESTIONS 1-9: 0
SUM OF ALL RESPONSES TO PHQ9 QUESTIONS 1 & 2: 0
1. LITTLE INTEREST OR PLEASURE IN DOING THINGS: 0
SUM OF ALL RESPONSES TO PHQ QUESTIONS 1-9: 0

## 2023-06-21 NOTE — PROGRESS NOTES
CHIEF COMPLAINT  Chief Complaint   Patient presents with    Annual Exam       SUBJECTIVE  Carmelita De Luna is a 26 y.o. female who presents to the office for annual wellness examination. Patient says that overall she has been doing well recently. She stays very active as a . She does admit to some frustration with weight gain over the past year. She is following with neurology for management of migraines. Patient says that migraines have improved since starting MagOx. She is using Maxalt for abortive therapy which has been very effective. Patient says that she is doing well otherwise and denies concerns or complaints today. ROS  All other review of systems negative, except for those noted. PAST MEDICAL HISTORY    Past Medical History:   Diagnosis Date    Asthma     Headache        SURGICAL HISTORY    History reviewed. No pertinent surgical history.     FAMILY HISTORY    Family History   Problem Relation Age of Onset    Asthma Mother     Other Mother         migranes    Heart Disease Maternal Grandfather         balloon surgery    Hypertension Maternal Grandfather     Kidney Disease Maternal Grandfather         stones    High Cholesterol Maternal Grandfather     Cancer Paternal Grandfather         leukemia    Asthma Maternal Aunt     Diabetes Maternal Grandmother        SOCIAL HISTORY    Social History     Socioeconomic History    Marital status: Single     Spouse name: None    Number of children: None    Years of education: None    Highest education level: None   Tobacco Use    Smoking status: Never    Smokeless tobacco: Never   Vaping Use    Vaping Use: Never used   Substance and Sexual Activity    Alcohol use: No    Drug use: No     Social Determinants of Health     Financial Resource Strain: Low Risk     Difficulty of Paying Living Expenses: Not hard at all   Food Insecurity: No Food Insecurity    Worried About Running Out of Food in the Last Year: Never true    Ran Out of Food in the

## 2023-06-22 SDOH — ECONOMIC STABILITY: FOOD INSECURITY: WITHIN THE PAST 12 MONTHS, YOU WORRIED THAT YOUR FOOD WOULD RUN OUT BEFORE YOU GOT MONEY TO BUY MORE.: NEVER TRUE

## 2023-06-22 SDOH — ECONOMIC STABILITY: FOOD INSECURITY: WITHIN THE PAST 12 MONTHS, THE FOOD YOU BOUGHT JUST DIDN'T LAST AND YOU DIDN'T HAVE MONEY TO GET MORE.: NEVER TRUE

## 2023-06-22 SDOH — ECONOMIC STABILITY: HOUSING INSECURITY
IN THE LAST 12 MONTHS, WAS THERE A TIME WHEN YOU DID NOT HAVE A STEADY PLACE TO SLEEP OR SLEPT IN A SHELTER (INCLUDING NOW)?: NO

## 2023-06-22 SDOH — ECONOMIC STABILITY: INCOME INSECURITY: HOW HARD IS IT FOR YOU TO PAY FOR THE VERY BASICS LIKE FOOD, HOUSING, MEDICAL CARE, AND HEATING?: NOT HARD AT ALL

## 2023-08-18 DIAGNOSIS — G43.829 MENSTRUAL MIGRAINE WITHOUT STATUS MIGRAINOSUS, NOT INTRACTABLE: ICD-10-CM

## 2023-08-18 DIAGNOSIS — N94.6 DYSMENORRHEA: ICD-10-CM

## 2023-08-21 RX ORDER — NORETHINDRONE ACETATE AND ETHINYL ESTRADIOL 1MG-20(21)
1 KIT ORAL DAILY
Qty: 84 TABLET | Refills: 0 | Status: SHIPPED | OUTPATIENT
Start: 2023-08-21

## 2023-08-21 NOTE — TELEPHONE ENCOUNTER
Last Appt:  6/21/2023  Next Appt:   Visit date not found  Med verified in [address] [place] [address]

## 2023-09-03 ASSESSMENT — SOCIAL DETERMINANTS OF HEALTH (SDOH)
WITHIN THE LAST YEAR, HAVE YOU BEEN KICKED, HIT, SLAPPED, OR OTHERWISE PHYSICALLY HURT BY YOUR PARTNER OR EX-PARTNER?: NO
WITHIN THE LAST YEAR, HAVE YOU BEEN AFRAID OF YOUR PARTNER OR EX-PARTNER?: NO
WITHIN THE LAST YEAR, HAVE YOU BEEN HUMILIATED OR EMOTIONALLY ABUSED IN OTHER WAYS BY YOUR PARTNER OR EX-PARTNER?: NO
WITHIN THE LAST YEAR, HAVE TO BEEN RAPED OR FORCED TO HAVE ANY KIND OF SEXUAL ACTIVITY BY YOUR PARTNER OR EX-PARTNER?: NO

## 2023-09-06 ENCOUNTER — HOSPITAL ENCOUNTER (OUTPATIENT)
Age: 23
Setting detail: SPECIMEN
Discharge: HOME | End: 2023-09-06

## 2023-09-06 ENCOUNTER — OFFICE VISIT (OUTPATIENT)
Dept: OBGYN CLINIC | Age: 23
End: 2023-09-06
Payer: COMMERCIAL

## 2023-09-06 VITALS
HEIGHT: 63 IN | HEART RATE: 72 BPM | BODY MASS INDEX: 29.23 KG/M2 | DIASTOLIC BLOOD PRESSURE: 84 MMHG | SYSTOLIC BLOOD PRESSURE: 121 MMHG | WEIGHT: 165 LBS

## 2023-09-06 DIAGNOSIS — G43.829 MENSTRUAL MIGRAINE WITHOUT STATUS MIGRAINOSUS, NOT INTRACTABLE: ICD-10-CM

## 2023-09-06 DIAGNOSIS — Z76.89 ENCOUNTER TO ESTABLISH CARE WITH NEW DOCTOR: ICD-10-CM

## 2023-09-06 DIAGNOSIS — Z01.419 WOMEN'S ANNUAL ROUTINE GYNECOLOGICAL EXAMINATION: Primary | ICD-10-CM

## 2023-09-06 DIAGNOSIS — N94.6 DYSMENORRHEA: ICD-10-CM

## 2023-09-06 PROCEDURE — 99385 PREV VISIT NEW AGE 18-39: CPT | Performed by: OBSTETRICS & GYNECOLOGY

## 2023-09-06 RX ORDER — NORETHINDRONE ACETATE AND ETHINYL ESTRADIOL 1MG-20(21)
1 KIT ORAL DAILY
Qty: 84 TABLET | Refills: 3 | Status: SHIPPED | OUTPATIENT
Start: 2023-09-06

## 2023-09-06 ASSESSMENT — ENCOUNTER SYMPTOMS
ABDOMINAL PAIN: 0
BACK PAIN: 0
SHORTNESS OF BREATH: 0
COUGH: 0

## 2023-09-06 NOTE — PROGRESS NOTES
Chaperone for Intimate Exam  Chaperone was offered as part of the rooming process. Patient declined and agrees to continue with exam without a chaperone.
without clubbing, cyanosis or edema. F.R.O.M. Neurologic Exam: Grossly intact without noted sensorimotor deficits and oriented x 3. Assessment/Plan:   Unremarkable annual Gyn exam.    Cervical Cytology Evaluation begins at 24years old. If Negative Cytology, Follow-up screening per current guidelines. Mammograms every 1year. If 35 yo and last mammogram was negative. Hereditary Breast, Ovarian, Colon and Uterine Cancer screening done. Birth control and barrier recommendations discussed. STD counseling and prevention reviewed. Gardisil counseling completed for all patients 7-36 yo. Routine health maintenance per patients PCP.   Pt to follow up for annual exam in 1 year    Maik Elder MD  [address] [facility]

## 2023-09-16 LAB — CYTOLOGY REPORT: NORMAL

## 2023-09-28 ENCOUNTER — OFFICE VISIT (OUTPATIENT)
Dept: NEUROLOGY | Age: 23
End: 2023-09-28
Payer: COMMERCIAL

## 2023-09-28 VITALS
HEIGHT: 63 IN | DIASTOLIC BLOOD PRESSURE: 81 MMHG | BODY MASS INDEX: 29.98 KG/M2 | SYSTOLIC BLOOD PRESSURE: 125 MMHG | WEIGHT: 169.2 LBS | HEART RATE: 101 BPM

## 2023-09-28 DIAGNOSIS — G43.101 MIGRAINE WITH AURA AND WITH STATUS MIGRAINOSUS, NOT INTRACTABLE: Primary | ICD-10-CM

## 2023-09-28 PROCEDURE — G8427 DOCREV CUR MEDS BY ELIG CLIN: HCPCS | Performed by: STUDENT IN AN ORGANIZED HEALTH CARE EDUCATION/TRAINING PROGRAM

## 2023-09-28 PROCEDURE — G8419 CALC BMI OUT NRM PARAM NOF/U: HCPCS | Performed by: STUDENT IN AN ORGANIZED HEALTH CARE EDUCATION/TRAINING PROGRAM

## 2023-09-28 PROCEDURE — 1036F TOBACCO NON-USER: CPT | Performed by: STUDENT IN AN ORGANIZED HEALTH CARE EDUCATION/TRAINING PROGRAM

## 2023-09-28 PROCEDURE — 99214 OFFICE O/P EST MOD 30 MIN: CPT | Performed by: STUDENT IN AN ORGANIZED HEALTH CARE EDUCATION/TRAINING PROGRAM

## 2023-09-28 RX ORDER — ONDANSETRON 4 MG/1
4 TABLET, ORALLY DISINTEGRATING ORAL 3 TIMES DAILY PRN
Qty: 21 TABLET | Refills: 2 | Status: SHIPPED | OUTPATIENT
Start: 2023-09-28

## 2023-09-28 RX ORDER — LANOLIN ALCOHOL/MO/W.PET/CERES
400 CREAM (GRAM) TOPICAL DAILY
Qty: 30 TABLET | Refills: 2 | Status: SHIPPED | OUTPATIENT
Start: 2023-09-28

## 2023-09-28 ASSESSMENT — ENCOUNTER SYMPTOMS
RESPIRATORY NEGATIVE: 1
EYES NEGATIVE: 1
GASTROINTESTINAL NEGATIVE: 1

## 2023-09-28 NOTE — PROGRESS NOTES
facial symmetry                                                             VIII - intact hearing                                                                             IX, X - symmetrical palate                                                                  XI - symmetrical shoulder shrug                                                       XII - tongue midline without atrophy or fasciculation      Motor function  Normal muscle bulk and tone; strength 5/5 on all 4 extremities, no pronator drift      Sensory function Intact to light touch, pinprick, vibration, proprioception on all 4 extremities      Cerebellar Intact fine motor movement. No involuntary movements or tremors. No ataxia or dysmetria on finger to nose or heel to shin testing      Reflex function DTR 2+ on bilateral UE and LE, symmetric. Gait                   normal base and arm swing        ASSESSMENT / PLAN:   Kirill Reynoso is a 26 y.o. female that established care with neurology for Migraine with aura. Previously been on propanolol and Topamax for migraine prophylaxis and were not effective. She had issues with Halima Malling coverage so stopped taking it.      Plan: Currently having migraines 2 times a month  -Continue  rizatriptan for abortive therapy.   -Continue magnesium oxide for migraine prophylaxis  -Start Zofran for nausea associated with the headache, discussed taking it at onset of headache since nausea can last up to three days      Adelso Martinez MD   Neurology & [address] [place] [address]

## 2023-12-02 ENCOUNTER — OFFICE VISIT (OUTPATIENT)
Dept: PRIMARY CARE CLINIC | Age: 23
End: 2023-12-02
Payer: COMMERCIAL

## 2023-12-02 VITALS
BODY MASS INDEX: 29.9 KG/M2 | TEMPERATURE: 98 F | SYSTOLIC BLOOD PRESSURE: 112 MMHG | DIASTOLIC BLOOD PRESSURE: 72 MMHG | HEART RATE: 74 BPM | WEIGHT: 168.8 LBS | OXYGEN SATURATION: 98 %

## 2023-12-02 DIAGNOSIS — J01.40 ACUTE NON-RECURRENT PANSINUSITIS: Primary | ICD-10-CM

## 2023-12-02 PROCEDURE — 99213 OFFICE O/P EST LOW 20 MIN: CPT | Performed by: FAMILY MEDICINE

## 2023-12-02 PROCEDURE — G8484 FLU IMMUNIZE NO ADMIN: HCPCS | Performed by: FAMILY MEDICINE

## 2023-12-02 PROCEDURE — G8419 CALC BMI OUT NRM PARAM NOF/U: HCPCS | Performed by: FAMILY MEDICINE

## 2023-12-02 PROCEDURE — 1036F TOBACCO NON-USER: CPT | Performed by: FAMILY MEDICINE

## 2023-12-02 PROCEDURE — G8427 DOCREV CUR MEDS BY ELIG CLIN: HCPCS | Performed by: FAMILY MEDICINE

## 2023-12-02 RX ORDER — AMOXICILLIN 500 MG/1
500 CAPSULE ORAL 2 TIMES DAILY
Qty: 20 CAPSULE | Refills: 0 | Status: SHIPPED | OUTPATIENT
Start: 2023-12-02 | End: 2023-12-12

## 2023-12-02 ASSESSMENT — PATIENT HEALTH QUESTIONNAIRE - PHQ9
1. LITTLE INTEREST OR PLEASURE IN DOING THINGS: 0
SUM OF ALL RESPONSES TO PHQ QUESTIONS 1-9: 0
SUM OF ALL RESPONSES TO PHQ QUESTIONS 1-9: 0
SUM OF ALL RESPONSES TO PHQ9 QUESTIONS 1 & 2: 0
SUM OF ALL RESPONSES TO PHQ QUESTIONS 1-9: 0
2. FEELING DOWN, DEPRESSED OR HOPELESS: 0
SUM OF ALL RESPONSES TO PHQ QUESTIONS 1-9: 0

## 2023-12-02 ASSESSMENT — ENCOUNTER SYMPTOMS
COUGH: 0
TROUBLE SWALLOWING: 0
DIARRHEA: 0
CHEST TIGHTNESS: 0
SORE THROAT: 0
CHOKING: 0
WHEEZING: 0
SHORTNESS OF BREATH: 0
SINUS PRESSURE: 1
CONSTIPATION: 0
NAUSEA: 0

## 2023-12-02 NOTE — PROGRESS NOTES
[facility]  [address]  [address] [address]  Dept: [phone]  Dept Fax: [phone]    Kathleen Dyer is a 26 y.o. female who presents today for her medical conditions/complaints as noted below. Kathleen Dyer is c/o of   Chief Complaint   Patient presents with    Sinus Problem     Congestion for a week, fever one day        HPI:     Sinusitis  This is a new problem. The current episode started 1 to 4 weeks ago (10 days). The problem has been gradually worsening since onset. The maximum temperature recorded prior to her arrival was 101 - 101.9 F. The fever has been present for 1 to 2 days. Her pain is at a severity of 5/10. The pain is moderate. Associated symptoms include congestion, ear pain, headaches and sinus pressure. Pertinent negatives include no chills, coughing, shortness of breath, sneezing or sore throat. (Lot of post nasal drip) Past treatments include oral decongestants and acetaminophen (ben leach, nsaids). The treatment provided mild relief.          Past Medical History:   Diagnosis Date    Asthma     Headache           Social History     Tobacco Use    Smoking status: Never    Smokeless tobacco: Never   Substance Use Topics    Alcohol use: No     Current Outpatient Medications   Medication Sig Dispense Refill    amoxicillin (AMOXIL) 500 MG capsule Take 1 capsule by mouth 2 times daily for 10 days 20 capsule 0    ondansetron (ZOFRAN-ODT) 4 MG disintegrating tablet Take 1 tablet by mouth 3 times daily as needed for Nausea or Vomiting 21 tablet 2    magnesium oxide (MAG-OX) 400 (240 Mg) MG tablet Take 1 tablet by mouth daily 30 tablet 2    norethindrone-ethinyl estradiol (BLISOVI FE 1/20) 1-20 MG-MCG per tablet Take 1 tablet by mouth daily 84 tablet 3    ibuprofen (ADVIL;MOTRIN) 600 MG tablet Take 1 tablet by mouth 3 times daily (with meals) 30 tablet 0

## 2024-01-15 NOTE — TELEPHONE ENCOUNTER
Please fill for Dr. Short as she is out of the office;    Pharmacy requesting refill of magnesium oxide 400mg.      Medication active on med list yes      Date of last Rx: 9/28/2023 with 2 refills          verified by ILEANA HUGHES        Pharmacy requesting refill of rizatriptan 10mg.      Medication active on med list yes      Date of last Rx: 4/11/2023 with 3 refills          verified by ILEANA HUGHES      Date of last appointment 9/28/2023    Next Visit Date:  3/28/2024

## 2024-01-16 RX ORDER — LANOLIN ALCOHOL/MO/W.PET/CERES
400 CREAM (GRAM) TOPICAL DAILY
Qty: 30 TABLET | Refills: 5 | Status: SHIPPED | OUTPATIENT
Start: 2024-01-16

## 2024-01-16 RX ORDER — RIZATRIPTAN BENZOATE 10 MG/1
10 TABLET ORAL PRN
Qty: 10 TABLET | Refills: 5 | Status: SHIPPED | OUTPATIENT
Start: 2024-01-16

## 2024-04-02 ENCOUNTER — OFFICE VISIT (OUTPATIENT)
Dept: NEUROLOGY | Age: 24
End: 2024-04-02
Payer: COMMERCIAL

## 2024-04-02 VITALS
SYSTOLIC BLOOD PRESSURE: 108 MMHG | HEIGHT: 63 IN | HEART RATE: 72 BPM | DIASTOLIC BLOOD PRESSURE: 75 MMHG | WEIGHT: 165 LBS | BODY MASS INDEX: 29.23 KG/M2

## 2024-04-02 DIAGNOSIS — G43.101 MIGRAINE WITH AURA AND WITH STATUS MIGRAINOSUS, NOT INTRACTABLE: Primary | ICD-10-CM

## 2024-04-02 PROCEDURE — G2211 COMPLEX E/M VISIT ADD ON: HCPCS | Performed by: STUDENT IN AN ORGANIZED HEALTH CARE EDUCATION/TRAINING PROGRAM

## 2024-04-02 PROCEDURE — G8427 DOCREV CUR MEDS BY ELIG CLIN: HCPCS | Performed by: STUDENT IN AN ORGANIZED HEALTH CARE EDUCATION/TRAINING PROGRAM

## 2024-04-02 PROCEDURE — G8419 CALC BMI OUT NRM PARAM NOF/U: HCPCS | Performed by: STUDENT IN AN ORGANIZED HEALTH CARE EDUCATION/TRAINING PROGRAM

## 2024-04-02 PROCEDURE — 99214 OFFICE O/P EST MOD 30 MIN: CPT | Performed by: STUDENT IN AN ORGANIZED HEALTH CARE EDUCATION/TRAINING PROGRAM

## 2024-04-02 PROCEDURE — 1036F TOBACCO NON-USER: CPT | Performed by: STUDENT IN AN ORGANIZED HEALTH CARE EDUCATION/TRAINING PROGRAM

## 2024-04-02 RX ORDER — FREMANEZUMAB-VFRM 225 MG/1.5ML
225 INJECTION SUBCUTANEOUS
Qty: 1 EACH | Refills: 2 | Status: SHIPPED | OUTPATIENT
Start: 2024-04-02

## 2024-04-02 RX ORDER — ONDANSETRON 4 MG/1
4 TABLET, ORALLY DISINTEGRATING ORAL 3 TIMES DAILY PRN
Qty: 21 TABLET | Refills: 2 | Status: SHIPPED | OUTPATIENT
Start: 2024-04-02

## 2024-04-02 ASSESSMENT — ENCOUNTER SYMPTOMS
GASTROINTESTINAL NEGATIVE: 1
RESPIRATORY NEGATIVE: 1
EYES NEGATIVE: 1

## 2024-04-02 NOTE — PROGRESS NOTES
ondansetron (ZOFRAN-ODT) 4 MG disintegrating tablet Take 1 tablet by mouth 3 times daily as needed for Nausea or Vomiting 21 tablet 2   • norethindrone-ethinyl estradiol (BLISOVI FE 1/20) 1-20 MG-MCG per tablet Take 1 tablet by mouth daily 84 tablet 3   • ibuprofen (ADVIL;MOTRIN) 600 MG tablet Take 1 tablet by mouth 3 times daily (with meals) 30 tablet 0   • fluticasone (FLONASE) 50 MCG/ACT nasal spray 1 spray by Each Nostril route 2 times daily (Patient not taking: Reported on 12/2/2023) 16 g 0     No current facility-administered medications for this visit.        Allergies   Allergen Reactions   • Seasonal    • Imitrex [Sumatriptan] Nausea And Vomiting        REVIEW OF SYSTEMS:     Review of Systems   Constitutional: Negative.    HENT: Negative.     Eyes: Negative.    Respiratory: Negative.     Cardiovascular: Negative.    Gastrointestinal: Negative.    Endocrine: Negative.    Genitourinary: Negative.    Musculoskeletal: Negative.    Skin: Negative.    Neurological:  Positive for headaches. Negative for dizziness, tremors, seizures, syncope, facial asymmetry, speech difficulty, weakness, light-headedness and numbness.   Hematological: Negative.    Psychiatric/Behavioral:  Negative for sleep disturbance.           NEUROLOGICAL EXAMINATION:   VITALS  Ht 1.6 m (5' 3\")   Wt 74.8 kg (165 lb)   BMI 29.23 kg/m²      Mental status    Alert and oriented x 3; intact memory with no confusion, speech or language problems; no hallucinations or delusions     Cranial nerves    II - visual fields intact to confrontation  III, IV, VI - extra-ocular muscles full: no pupillary defect; no IZZY, no nystagmus, no ptosis   V - normal facial sensation                                                               VII - normal facial symmetry                                                             VIII - intact hearing                                                                             IX, X - symmetrical palate

## 2024-04-04 ENCOUNTER — TELEPHONE (OUTPATIENT)
Dept: NEUROLOGY | Age: 24
End: 2024-04-04

## 2024-06-25 ENCOUNTER — OFFICE VISIT (OUTPATIENT)
Dept: NEUROLOGY | Age: 24
End: 2024-06-25
Payer: COMMERCIAL

## 2024-06-25 VITALS
HEIGHT: 63 IN | WEIGHT: 175 LBS | DIASTOLIC BLOOD PRESSURE: 76 MMHG | HEART RATE: 76 BPM | BODY MASS INDEX: 31.01 KG/M2 | SYSTOLIC BLOOD PRESSURE: 113 MMHG

## 2024-06-25 DIAGNOSIS — G43.101 MIGRAINE WITH AURA AND WITH STATUS MIGRAINOSUS, NOT INTRACTABLE: Primary | ICD-10-CM

## 2024-06-25 PROCEDURE — 1036F TOBACCO NON-USER: CPT | Performed by: STUDENT IN AN ORGANIZED HEALTH CARE EDUCATION/TRAINING PROGRAM

## 2024-06-25 PROCEDURE — G2211 COMPLEX E/M VISIT ADD ON: HCPCS | Performed by: STUDENT IN AN ORGANIZED HEALTH CARE EDUCATION/TRAINING PROGRAM

## 2024-06-25 PROCEDURE — 99214 OFFICE O/P EST MOD 30 MIN: CPT | Performed by: STUDENT IN AN ORGANIZED HEALTH CARE EDUCATION/TRAINING PROGRAM

## 2024-06-25 PROCEDURE — G8427 DOCREV CUR MEDS BY ELIG CLIN: HCPCS | Performed by: STUDENT IN AN ORGANIZED HEALTH CARE EDUCATION/TRAINING PROGRAM

## 2024-06-25 PROCEDURE — G8417 CALC BMI ABV UP PARAM F/U: HCPCS | Performed by: STUDENT IN AN ORGANIZED HEALTH CARE EDUCATION/TRAINING PROGRAM

## 2024-06-25 ASSESSMENT — ENCOUNTER SYMPTOMS
GASTROINTESTINAL NEGATIVE: 1
EYES NEGATIVE: 1
RESPIRATORY NEGATIVE: 1

## 2024-06-25 NOTE — PROGRESS NOTES
[facility] NEUROLOGY SPECIALIST  [address]  [facility] [place] [address]  Dept: [phone]    PATIENT NAME: Arlen Vo  PATIENT MRN: [id number]  PRIMARY CARE PHYSICIAN: Liliya Lazaro PA    HPI:      Arlen Vo is a 24 y.o. female who presents to clinic today for follow up of Migraine     Interim history:  Patient was started on Ajovy at last clinic visit.  Patient notes Ajovy is working well for her but has noted some swelling and redness at site of injection.  Patient notes the first month she used Ajovy she did not have any migraines at month.  She notes the most recent injection was last month she has had 3 migraines so far.  Patient notes her migraines have significantly improved with the addition of Ajovy.  Denies any other side effects.  Patient takes rizatriptan for abortive therapy but notes it is not effective.  Denies any side effects.      Prior history:  She notes she has weeks where she gets frequent migraines about 5 migraines a week. Some weeks she has migraines just once a week. She also reports that aura can occur multiple times a day when it is severe. She notes headaches are located mainly in the left fronto-temporal area associated with severe nausea.  She notes she has aura of numbness in her fingers, toes or face. She notes the location varies. She notes she also notes a cognitive change and has issues with speaking. She is taking magnesium oxide for migraine prophylaxis.       Headache History:  Headaches started at menarche at age 12-13.   The pain is predominantly located in left occipital area region and radiate to the frontal region.   Usually the headache is preceded by an aura (\"TV fuzz\")  Currently, headaches are occurring once a week (they were occurring daily )  Patient describes the quality of pain as burning pain, sharp pain which varies in intensity 10/10. . Even when she does not have a migraine she still has the burning pain.    Associated symptoms

## 2024-07-09 RX ORDER — FREMANEZUMAB-VFRM 225 MG/1.5ML
225 INJECTION SUBCUTANEOUS
Qty: 1 EACH | Refills: 5 | Status: SHIPPED | OUTPATIENT
Start: 2024-07-09

## 2024-07-09 NOTE — TELEPHONE ENCOUNTER
Pharmacy requesting refill of Ajovy via fax request.      Medication active on med list yes      Date of last fill: 4/2/24  with 2 refills verified on 7/9/24  verified by LAMONTE, RN      Date of last appointment 6/25/24 with Dr. Short    Next Visit Date:  10/21/2024 with Dr. Anderson

## 2024-07-16 ENCOUNTER — TELEPHONE (OUTPATIENT)
Dept: NEUROLOGY | Age: 24
End: 2024-07-16

## 2024-07-17 NOTE — TELEPHONE ENCOUNTER
PA was not denied, they were requesting additional information for Navya DOYLE. Form printed, filled out and faxed back.

## 2024-07-22 ENCOUNTER — TELEPHONE (OUTPATIENT)
Dept: NEUROLOGY | Age: 24
End: 2024-07-22

## 2024-07-22 NOTE — TELEPHONE ENCOUNTER
Arlen call the office and stated that she has had a reaction every time she's done the ajovy injection, she is also having a hard time obtaining it due to insurance. It was discussed previously that she could get Botox.     Okay to proceed looking into insurance and putting her on for Botox injections?    Please advise.

## 2024-07-22 NOTE — TELEPHONE ENCOUNTER
I called and relayed all previously stated information to Arlen. She verbalized understanding and asked to be placed on the wait list for a sooner appointment. I will contact her tomorrow to see if there are any other openings before her next appointment on 10/21/2024.

## 2024-07-24 NOTE — TELEPHONE ENCOUNTER
No current appointment open before next scheduled on 10/21/2024. Patient has been added to the wait list.

## 2024-07-26 DIAGNOSIS — G43.829 MENSTRUAL MIGRAINE WITHOUT STATUS MIGRAINOSUS, NOT INTRACTABLE: ICD-10-CM

## 2024-07-26 DIAGNOSIS — N94.6 DYSMENORRHEA: ICD-10-CM

## 2024-07-26 RX ORDER — NORETHINDRONE ACETATE AND ETHINYL ESTRADIOL 1MG-20(21)
1 KIT ORAL DAILY
Qty: 84 TABLET | Refills: 0 | Status: SHIPPED | OUTPATIENT
Start: 2024-07-26

## 2024-10-14 DIAGNOSIS — G43.829 MENSTRUAL MIGRAINE WITHOUT STATUS MIGRAINOSUS, NOT INTRACTABLE: ICD-10-CM

## 2024-10-14 DIAGNOSIS — N94.6 DYSMENORRHEA: ICD-10-CM

## 2024-10-14 RX ORDER — NORETHINDRONE ACETATE AND ETHINYL ESTRADIOL 1MG-20(21)
1 KIT ORAL DAILY
Qty: 84 TABLET | Refills: 0 | Status: SHIPPED | OUTPATIENT
Start: 2024-10-14

## 2024-10-20 NOTE — PROGRESS NOTES
Maternal Grandfather     Kidney Disease Maternal Grandfather         stones    High Cholesterol Maternal Grandfather     Cancer Paternal Grandfather         leukemia    Asthma Maternal Aunt     Diabetes Maternal Grandmother         Current Outpatient Medications   Medication Instructions    BLISOVI FE 1/20 1-20 MG-MCG per tablet 1 tablet, Oral, DAILY    cyclobenzaprine (FLEXERIL) 5 mg, Oral, DAILY PRN    fluticasone (FLONASE) 50 MCG/ACT nasal spray 1 spray, Each Nostril, 2 TIMES DAILY    ibuprofen (ADVIL;MOTRIN) 600 mg, Oral, 3 TIMES DAILY WITH MEALS    magnesium oxide (MAG-OX) 400 mg, Oral, DAILY    naratriptan (AMERGE) 2.5 mg, Oral, SEE ADMIN INSTRUCTIONS, 2.5 mg at onset of headache, may repeat in 4 hours if needed    ondansetron (ZOFRAN-ODT) 4 mg, Oral, 3 TIMES DAILY PRN    rizatriptan (MAXALT) 10 mg, Oral, PRN, May repeat in 2 hours if needed    Zavegepant HCl 10 mg, Nasal, PRN        Allergies   Allergen Reactions    Seasonal     Imitrex [Sumatriptan] Nausea And Vomiting        REVIEW OF SYSTEMS:     Review of Systems   All other systems reviewed and are negative.      NEUROLOGICAL EXAMINATION:   VITALS  /86 (Site: Left Upper Arm, Position: Sitting, Cuff Size: Medium Adult)   Pulse 81   Ht 1.6 m (5' 3\")   Wt 74.3 kg (163 lb 12.8 oz)   BMI 29.02 kg/m²       GENERAL PHYSICAL EXAM  General Appearance: No acute distress. Conversant.  Well-groomed.    Eyes: Anicteric sclerae. Moist conjunctivae. No lid-lag  HENT: Atraumatic. Oropharynx clear. Moist mucous membranes.  Neck: Trachea midline. Neck supple.   Lungs: Normal respiratory effort. No intercostal retractions. No dyspnea noted  Abdomen: Soft, non-tender, no masses noted.   Extremities: No clubbing. No cyanosis.No peripheral edema.  Skin: Normal temperature and texture, no rash, no ulcers noted  Pysch: No pseudobulbar affect noted. Euthymic      NEUROLOGICAL EXAM:  Mental status    Alert and oriented x 3; intact memory with no confusion, speech or

## 2024-10-21 ENCOUNTER — OFFICE VISIT (OUTPATIENT)
Dept: NEUROLOGY | Age: 24
End: 2024-10-21
Payer: COMMERCIAL

## 2024-10-21 ENCOUNTER — TELEPHONE (OUTPATIENT)
Dept: NEUROLOGY | Age: 24
End: 2024-10-21

## 2024-10-21 VITALS
WEIGHT: 163.8 LBS | HEART RATE: 81 BPM | BODY MASS INDEX: 29.02 KG/M2 | HEIGHT: 63 IN | SYSTOLIC BLOOD PRESSURE: 130 MMHG | DIASTOLIC BLOOD PRESSURE: 86 MMHG

## 2024-10-21 DIAGNOSIS — G43.E19 INTRACTABLE CHRONIC MIGRAINE WITH AURA AND WITHOUT STATUS MIGRAINOSUS: Primary | ICD-10-CM

## 2024-10-21 DIAGNOSIS — M54.2 NECK PAIN: ICD-10-CM

## 2024-10-21 DIAGNOSIS — G44.86 CERVICOGENIC HEADACHE: ICD-10-CM

## 2024-10-21 PROCEDURE — G8427 DOCREV CUR MEDS BY ELIG CLIN: HCPCS | Performed by: PSYCHIATRY & NEUROLOGY

## 2024-10-21 PROCEDURE — 99215 OFFICE O/P EST HI 40 MIN: CPT | Performed by: PSYCHIATRY & NEUROLOGY

## 2024-10-21 PROCEDURE — 1036F TOBACCO NON-USER: CPT | Performed by: PSYCHIATRY & NEUROLOGY

## 2024-10-21 PROCEDURE — G8417 CALC BMI ABV UP PARAM F/U: HCPCS | Performed by: PSYCHIATRY & NEUROLOGY

## 2024-10-21 PROCEDURE — G8484 FLU IMMUNIZE NO ADMIN: HCPCS | Performed by: PSYCHIATRY & NEUROLOGY

## 2024-10-21 RX ORDER — CYCLOBENZAPRINE HCL 5 MG
5 TABLET ORAL DAILY PRN
Qty: 30 TABLET | Refills: 11 | Status: SHIPPED | OUTPATIENT
Start: 2024-10-21 | End: 2025-04-19

## 2024-10-21 RX ORDER — ONDANSETRON 4 MG/1
4 TABLET, ORALLY DISINTEGRATING ORAL 3 TIMES DAILY PRN
Qty: 21 TABLET | Refills: 11 | Status: SHIPPED | OUTPATIENT
Start: 2024-10-21

## 2024-10-21 RX ORDER — NARATRIPTAN 2.5 MG/1
2.5 TABLET ORAL SEE ADMIN INSTRUCTIONS
Qty: 9 TABLET | Refills: 11 | Status: SHIPPED | OUTPATIENT
Start: 2024-10-21

## 2024-10-21 NOTE — TELEPHONE ENCOUNTER
Received a fax from pharmacy stating naratriptan needs PA. This was completed on CM.    PA approved through 10/21/2025.     Attempted to notify patient; a message was left with this information as well as the office phone number in the event there are further questions.

## 2024-10-28 ENCOUNTER — HOSPITAL ENCOUNTER (OUTPATIENT)
Dept: PHYSICAL THERAPY | Age: 24
Setting detail: THERAPIES SERIES
Discharge: HOME | End: 2024-10-28
Attending: PSYCHIATRY & NEUROLOGY
Payer: COMMERCIAL

## 2024-10-28 PROCEDURE — 97161 PT EVAL LOW COMPLEX 20 MIN: CPT | Performed by: PHYSICAL THERAPIST

## 2024-10-28 PROCEDURE — 97110 THERAPEUTIC EXERCISES: CPT | Performed by: PHYSICAL THERAPIST

## 2024-10-28 ASSESSMENT — PAIN DESCRIPTION - DESCRIPTORS: DESCRIPTORS: ACHING;SHARP

## 2024-10-28 ASSESSMENT — PAIN DESCRIPTION - LOCATION: LOCATION: NECK;HEAD

## 2024-10-28 ASSESSMENT — PAIN DESCRIPTION - PAIN TYPE: TYPE: CHRONIC PAIN

## 2024-10-28 ASSESSMENT — PAIN DESCRIPTION - ORIENTATION: ORIENTATION: RIGHT;LEFT

## 2024-10-28 ASSESSMENT — PAIN SCALES - GENERAL: PAINLEVEL_OUTOF10: 4

## 2024-10-28 NOTE — FLOWSHEET NOTE
Physical Therapy Daily Treatment Note    Date:  10/28/2024    Patient Name:  Arlen Vo    :  2000  MRN: [id number]  Restrictions/Precautions:     Medical/Treatment Diagnosis Information:   Diagnosis: M54.2 neck pain, G44.86 cervicogenic headache  Treatment Diagnosis: M54.2 neck pain, post derangement  Insurance/Certification information:  PT Insurance Information: [facility]  Physician Information:   Laxmi Bite DO- [facility]  Plan of care signed (Y/N):    Visit# / total visits:  1/10  Pain level: 8-9/10       Time In:8:00   Time Out:8:45    Progress Note: [x]  Yes  []  No  Next due by: Visit #10  , or 24    Subjective:   See eval    Objective: See eval  Observation:   Test measurements:      Exercises: ( no flexion)  Exercise/Equipment Resistance/Repetitions Other comments   R side bend 10x    Retraction  10x Add fingertip OP   Retraction/extension 10x    Active rotation 10x R, 10x L         Manual retraction/extension 5'                         Cervical roll 2'              Traction     [x] Provided verbal/tactile cueing for activities related to strengthening, flexibility, endurance, ROM. (40937)  [] Provided verbal/tactile cueing for activities related to improving balance, coordination, kinesthetic sense, posture, motor skill, proprioception. (60529)    Therapeutic Activities:     [] Therapeutic activities, direct (one-on-one) patient contact (use of dynamic activities to improve functional performance). (80350)    Gait:   [] Provided training and instruction to the patient for ambulation re-education. (31365)    Self-Care/ADL's  [] Self-care/home management training and compensatory training, meal preparation, safety procedures, and instructions in use of assistive technology devices/adaptive equipment, direct one-on-one contact. (28612)    Home Exercise Program:   C-spine extension progression for post derangement  [x] Reviewed/Progressed HEP activities related to strengthening,

## 2024-10-28 NOTE — PROGRESS NOTES
Physical Therapy  Initial Assessment  Date: 10/28/2024  Patient Name: Arlen Vo  MRN: [id number]  : 2000    Referring Physician: Laxmi Anderson DO Anna Bite DO- neurology [facility]   PCP: Liliya Lazaro PA     Medical Diagnosis: Cervicogenic headache [G44.86]  Neck pain [M54.2] M54.2 neck pain, G44.86 cervicogenic headache  Treatment Diagnosis: M54.2 neck pain, post derangement      Insurance: Payor: [employer] / Plan: [employer] [address] / Product Type: *No Product type* /   Insurance ID: [id number] - (Commercial)      Restrictions:       Subjective:  General  Chart Reviewed: Yes  Patient Assessed for Rehabilitation Services: Yes  History obtained from:: Patient, Chart Review  Family/Caregiver Present: No  Diagnosis: M54.2 neck pain, G44.86 cervicogenic headache  Referring Provider (secondary): Laxmi Anderson DO- [facility]  Follows Commands: Within Functional Limits  PT Visit Information  Onset Date: 10/28/24  PT Insurance Information: [facility]  Referring Provider (secondary): Laxmi Anderson DO- [employer] [facility]  Subjective  Subjective: Has had pain problems for about 10 yrs. No trauma history. [facility] seeing [facility] [place] [facility] 2 [facility]. Mostly medication attempts at treatment. When bad gets nauseated, vomiting. Weakness in the arm.  Prior diagnostic testing:: MRI  Dominant Hand: : Right  Pain Screening  Patient Currently in Pain: Yes  Pain Assessment: 0-10  Pain Level: 4  Best Pain Level: 4  Worst Pain Level: 9  Pain Type: Chronic pain  Pain Location: Neck, Head  Pain Orientation: Right, Left  Pain Descriptors: Aching, Sharp       Vision/Hearing:  Vision  Vision: Within Functional Limits  Hearing  Hearing: Within functional limits    Orientation:  Orientation  Overall Orientation Status: Within Normal Limits  Follows Commands: Within Functional Limits    Social History:  Social History  Lives With: Significant other  Type of Home: House    Functional

## 2024-10-28 NOTE — PLAN OF CARE
[facility]  Rehabilitation and Sports Medicine    [] [place]  Phone: [phone]  Fax: [phone]      [] [place]  Phone: [phone]  Fax: [phone]        To:        Patient: Arlen Vo  : 2000   MRN: [id number]  Evaluation Date: 10/28/2024      Diagnosis Information:  Diagnosis: M54.2 neck pain, G44.86 cervicogenic headache   Treatment Diagnosis: M54.2 neck pain, post derangement     Physical Therapy Certification Form  Dear   The following patient has been evaluated for physical therapy services and for therapy to continue, Medicare requires monthly physician review of the treatment plan. Please review the attached evaluation and/or summary of the patient's plan of care, and verify that you agree therapy should continue by signing the attached document and sending it back to our office.    Plan of Care/Treatment to date:  [x] Therapeutic Exercise    [] Modalities:  [] Therapeutic Activity     [] Ultrasound  [] Electrical Stimulation  [] Gait Training      [x] Cervical Traction [] Lumbar Traction  [] Neuromuscular Re-education    [] Cold/hotpack [] Iontophoresis   [x] Instruction in HEP     Other:  [x] Manual Therapy      []             [] Aquatic Therapy      []                 Goals:  Short Term Goals  Time Frame for Short Term Goals: 1 week  Short Term Goal 1: Start HEP    Long Term Goals  Time Frame for Long Term Goals : 4 weeks  Long Term Goal 1: Pain controlled at 1-2/10 to allow regular ADL  Long Term Goal 2: AROM extension 50-55 deg, rotations 85 deg  for more normal spinal mechanics  Long Term Goal 3: Able to complete work duty with pain controlled at 2/10  Long Term Goal 4: Minimize reliance on medications for pain control    Frequency/Duration:10/28/24 - 24  # Days per week: [] 1 day # Weeks: [] 1 week [] 5 weeks     [x] 2 days   [] 2 weeks [] 6 weeks     [] 3 days   [] 3 weeks [] 7 weeks     [] 4 days   [x] 4 weeks [] 8 weeks    Rehab Potential: []

## 2024-10-30 ENCOUNTER — HOSPITAL ENCOUNTER (OUTPATIENT)
Dept: PHYSICAL THERAPY | Age: 24
Setting detail: THERAPIES SERIES
Discharge: HOME | End: 2024-10-30
Attending: PSYCHIATRY & NEUROLOGY
Payer: COMMERCIAL

## 2024-10-30 PROCEDURE — 97140 MANUAL THERAPY 1/> REGIONS: CPT

## 2024-10-30 PROCEDURE — 97110 THERAPEUTIC EXERCISES: CPT

## 2024-10-30 NOTE — FLOWSHEET NOTE
plan (see comments)  [] Plan of care initiated [] Hold pending MD visit [] Discharge    Plan for Next Session:      Electronically signed by:  Kailey Braga PTA

## 2024-10-30 NOTE — PROGRESS NOTES
I have reviewed and agree to the content of the note written by the PTA.  Electronically signed by Brad Beltre PT [id number]

## 2024-11-04 ENCOUNTER — HOSPITAL ENCOUNTER (OUTPATIENT)
Dept: PHYSICAL THERAPY | Age: 24
Setting detail: THERAPIES SERIES
Discharge: HOME | End: 2024-11-04
Attending: PSYCHIATRY & NEUROLOGY
Payer: COMMERCIAL

## 2024-11-04 PROCEDURE — 97110 THERAPEUTIC EXERCISES: CPT | Performed by: PHYSICAL THERAPIST

## 2024-11-04 NOTE — FLOWSHEET NOTE
Physical Therapy Daily Treatment Note    Date:  2024    Patient Name:  Arlen Vo    :  2000  MRN: [id number]  Restrictions/Precautions:     Medical/Treatment Diagnosis Information:   Diagnosis: M54.2 neck pain, G44.86 cervicogenic headache  Treatment Diagnosis: M54.2 neck pain, post derangement  Insurance/Certification information:  PT Insurance Information: [facility]  Physician Information:   Laxmi Bite DO- [facility]  Plan of care signed (Y/N):  y  Visit# / total visits:  3/10  Pain level: 10       Time In: 8:42  Time Out:9:10    Progress Note: []  Yes  [x]  No  Next due by: Visit #10  , or 24    Subjective: Is feeling better. Constant downward gaze at desk/ computer is irritating.  Sleep is better with the cervical roll in place  Objective:   Observation:   Significant change in C-spine mechanics  Test measurements:    Extension 50 deg.  R rotation 85 deg    Exercises: (no flexion)  Exercise/Equipment Resistance/Repetitions Other comments   R side bend 10x    Retraction  10x, x2  fingertip OP   Retraction/extension 10x, x2    Retraction/extension/rotation 10x    Active rotation 10x R, 10x L, x2 Fingertip OP        Manual retraction/extension 5'         Occipital release  3'    OA joint flexion mob in supine 10x On towel roll         Cervical roll 2'              Traction      [x] Provided verbal/tactile cueing for activities related to strengthening, flexibility, endurance, ROM. (44719)  [] Provided verbal/tactile cueing for activities related to improving balance, coordination, kinesthetic sense, posture, motor skill, proprioception. (97909)    Therapeutic Activities:     [] Therapeutic activities, direct (one-on-one) patient contact (use of dynamic activities to improve functional performance). (85369)    Gait:   [] Provided training and instruction to the patient for ambulation re-education. (09408)    Self-Care/ADL's  [] Self-care/home management training and

## 2024-11-06 ENCOUNTER — HOSPITAL ENCOUNTER (OUTPATIENT)
Dept: PHYSICAL THERAPY | Age: 24
Setting detail: THERAPIES SERIES
Discharge: HOME | End: 2024-11-06
Attending: PSYCHIATRY & NEUROLOGY
Payer: COMMERCIAL

## 2024-11-06 PROCEDURE — 97110 THERAPEUTIC EXERCISES: CPT | Performed by: PHYSICAL THERAPIST

## 2024-11-06 NOTE — FLOWSHEET NOTE
safety procedures, and instructions in use of assistive technology devices/adaptive equipment, direct one-on-one contact. (67437)    Home Exercise Program:   C-spine extension progression for post derangement. Now advanced to supine position  [x] Reviewed/Progressed HEP activities related to strengthening, flexibility, endurance, ROM. (39807)  [] Reviewed/Progressed HEP activities related to improving balance, coordination, kinesthetic sense, posture, motor skill, proprioception.  (68916)    Manual Treatments:    [] Provided manual therapy to mobilize soft tissue/joints for the purpose of modulating pain, promoting relaxation,  increasing ROM, reducing/eliminating soft tissue swelling/inflammation/restriction, improving soft tissue extensibility. (15828)    Service Based Modalities:      Timed Code Treatment Minutes:  there ex 20'    Total Treatment Minutes:   20'    Treatment/Activity Tolerance:  [x] Patient tolerated treatment well [] Patient limited by fatique  [] Patient limited by pain  [] Patient limited by other medical complications  [] Other:     Prognosis: [x] Good [] Fair  [] Poor    Patient Requires Follow-up: [x] Yes  [] No      Goals:  Short Term Goals  Time Frame for Short Term Goals: 1 week  Short Term Goal 1: Start HEP- met    Long Term Goals  Time Frame for Long Term Goals : 4 weeks  Long Term Goal 1: Pain controlled at 1-2/10 to allow regular ADL  Long Term Goal 2: AROM extension 50-55 deg, rotations 85 deg  for more normal spinal mechanics  Long Term Goal 3: Able to complete work duty with pain controlled at 2/10  Long Term Goal 4: Minimize reliance on medications for pain control        Plan:   [x] Continue per plan of care [] Alter current plan (see comments)  [] Plan of care initiated [] Hold pending MD visit [] Discharge    Plan for Next Session:      Electronically signed by:  Brad Beltre, PT

## 2024-11-11 ENCOUNTER — HOSPITAL ENCOUNTER (OUTPATIENT)
Dept: PHYSICAL THERAPY | Age: 24
Setting detail: THERAPIES SERIES
Discharge: HOME | End: 2024-11-11
Attending: PSYCHIATRY & NEUROLOGY
Payer: COMMERCIAL

## 2024-11-11 PROCEDURE — 97110 THERAPEUTIC EXERCISES: CPT

## 2024-11-11 NOTE — FLOWSHEET NOTE
Physical Therapy Daily Treatment Note    Date:  2024    Patient Name:  Arlen Vo    :  2000  MRN: [id number]  Restrictions/Precautions:     Medical/Treatment Diagnosis Information:   Diagnosis: M54.2 neck pain, G44.86 cervicogenic headache  Treatment Diagnosis: M54.2 neck pain, post derangement  Insurance/Certification information:  PT Insurance Information: [facility]  Physician Information:   Laxmi Bite DO- [facility]  Plan of care signed (Y/N):  y  Visit# / total visits:  5/10  Pain level: 5/10       Time In: 7:57   Time Out :8:20    Progress Note: []  Yes  [x]  No  Next due by: Visit #10  , or 24    Subjective: Pt reports more sore this date. Had a rough headache this weekend. Reports overall pain has been down.     Objective: ERICA performed per flow sheet for increased mobility and stability for improvements in reduction of symptoms and completion of ADLs. Verbal cueing for sequencing and proper form.     Observation:   Significant change in C-spine mechanics    Test measurements:    Extension 50 deg.  R rotation 88, tight.   L rotation 90 deg , no pain deg    Exercises: (no flexion)  Exercise/Equipment Resistance/Repetitions Other comments   R side bend 15x    Retraction  10x, x2  fingertip OP   Retraction/extension 10x, x2    Retraction/extension/rotation 10x    Active rotation 10x R, 10x L, x2 Fingertip OP        Manual retraction/extension 5'         Occipital release  3'    OA joint flexion mob in supine 10x On towel roll         Cervical roll 2'              Traction      [x] Provided verbal/tactile cueing for activities related to strengthening, flexibility, endurance, ROM. (99263)  [] Provided verbal/tactile cueing for activities related to improving balance, coordination, kinesthetic sense, posture, motor skill, proprioception. (72411)    Therapeutic Activities:     [] Therapeutic activities, direct (one-on-one) patient contact (use of dynamic activities to improve

## 2024-11-12 NOTE — PROGRESS NOTES
I have reviewed and agree to the content of the note written by the PTA.  Electronically signed by Brad Beltre PT [id number]

## 2024-11-13 ENCOUNTER — HOSPITAL ENCOUNTER (OUTPATIENT)
Dept: PHYSICAL THERAPY | Age: 24
Setting detail: THERAPIES SERIES
Discharge: HOME | End: 2024-11-13
Attending: PSYCHIATRY & NEUROLOGY
Payer: COMMERCIAL

## 2024-11-13 PROCEDURE — 97110 THERAPEUTIC EXERCISES: CPT | Performed by: PHYSICAL THERAPIST

## 2024-11-13 NOTE — FLOWSHEET NOTE
Physical Therapy Daily Treatment Note    Date:  2024    Patient Name:  Arlen Vo    :  2000  MRN: [id number]  Restrictions/Precautions:     Medical/Treatment Diagnosis Information:   Diagnosis: M54.2 neck pain, G44.86 cervicogenic headache  Treatment Diagnosis: M54.2 neck pain, post derangement  Insurance/Certification information:  PT Insurance Information: [facility]  Physician Information:   Laxmi Bite DO- [facility]  Plan of care signed (Y/N):  y  Visit# / total visits:  6/10  Pain level: 5/10       Time In: 8:08 Time Out :8:35    Progress Note: []  Yes  [x]  No  Next due by: Visit #10  , or 24    Subjective: Sometimes wakes up with stiffness.  Objective:      Observation:   Significant change in C-spine mechanics    Test measurements:    Extension 50 deg.  R rotation 88, tight.   L rotation 90 deg , no pain   Tender at sub-occipital muscle group    Exercises: (no flexion)  Exercise/Equipment Resistance/Repetitions Other comments   R side bend 15x    Retraction  10x, x2  fingertip OP   Retraction/extension 10x, x2    Retraction/extension/rotation 10x    Active rotation 10x R, 10x L, x2 Fingertip OP   Isolated AA joint rotation 10xL, 10x R Side bend , fingertip on head for pivot point   Manual retraction/extension 5'         Occipital release  3'    OA joint flexion mob in supine 10x On towel roll         Cervical roll 2'              Traction      [x] Provided verbal/tactile cueing for activities related to strengthening, flexibility, endurance, ROM. (58900)  [] Provided verbal/tactile cueing for activities related to improving balance, coordination, kinesthetic sense, posture, motor skill, proprioception. (71960)    Therapeutic Activities:     [] Therapeutic activities, direct (one-on-one) patient contact (use of dynamic activities to improve functional performance). (56547)    Gait:   [] Provided training and instruction to the patient for ambulation re-education.  · Secondary to fall  · Original CT showed: 10 3 x 5 4 x 7 7 cm hematoma within subcutaneous soft tissue of the right flank with areas of active contrast extravasation  · Patient's hemoglobin was monitored IP--patient was discharge after it displayed stability  · Patient restarted his aspirin  Aspirin was held by PCP until trauma follow-up  · Will obtain hemoglobin, if stable patient may resume aspirin from a trauma perspective  · Analgesia as needed  · Discussed prognosis of hematoma, and the likelihood that he will have the hematoma before several months until it is fully reabsorbed  We also reviewed the role that  light activity, stretching, and heat in the reabsorption of the hematoma  We discussed return precautions  · If hemoglobin is stable, patient may follow-up with trauma as needed

## 2024-11-18 ENCOUNTER — HOSPITAL ENCOUNTER (OUTPATIENT)
Dept: PHYSICAL THERAPY | Age: 24
Setting detail: THERAPIES SERIES
Discharge: HOME | End: 2024-11-18
Attending: PSYCHIATRY & NEUROLOGY
Payer: COMMERCIAL

## 2024-11-18 PROCEDURE — 97110 THERAPEUTIC EXERCISES: CPT

## 2024-11-18 NOTE — FLOWSHEET NOTE
flexibility, endurance, ROM. (45618)  [] Provided verbal/tactile cueing for activities related to improving balance, coordination, kinesthetic sense, posture, motor skill, proprioception. (38986)    Therapeutic Activities:     [] Therapeutic activities, direct (one-on-one) patient contact (use of dynamic activities to improve functional performance). (88447)    Gait:   [] Provided training and instruction to the patient for ambulation re-education. (95190)    Self-Care/ADL's  [] Self-care/home management training and compensatory training, meal preparation, safety procedures, and instructions in use of assistive technology devices/adaptive equipment, direct one-on-one contact. (30520)    Home Exercise Program:   C-spine extension progression for post derangement. Now advanced to supine position  [x] Reviewed/Progressed HEP activities related to strengthening, flexibility, endurance, ROM. (65251)  [] Reviewed/Progressed HEP activities related to improving balance, coordination, kinesthetic sense, posture, motor skill, proprioception.  (82607)    Manual Treatments:    [] Provided manual therapy to mobilize soft tissue/joints for the purpose of modulating pain, promoting relaxation,  increasing ROM, reducing/eliminating soft tissue swelling/inflammation/restriction, improving soft tissue extensibility. (28317)    Service Based Modalities:      Timed Code Treatment Minutes:  there ex 25'    Total Treatment Minutes:   25'    Treatment/Activity Tolerance:  [x] Patient tolerated treatment well [] Patient limited by fatique  [] Patient limited by pain  [] Patient limited by other medical complications  [] Other:     Prognosis: [x] Good [] Fair  [] Poor    Patient Requires Follow-up: [x] Yes  [] No      Goals:  Short Term Goals  Time Frame for Short Term Goals: 1 week  Short Term Goal 1: Start HEP- met    Long Term Goals  Time Frame for Long Term Goals : 4 weeks  Long Term Goal 1: Pain controlled at 1-2/10 to allow regular

## 2024-11-19 NOTE — PROGRESS NOTES
I have reviewed and agree to the content of the note written by the PTA.  Electronically signed by Brad Beltre PT [id number]

## 2024-11-20 ENCOUNTER — HOSPITAL ENCOUNTER (OUTPATIENT)
Dept: PHYSICAL THERAPY | Age: 24
Setting detail: THERAPIES SERIES
Discharge: HOME | End: 2024-11-20
Attending: PSYCHIATRY & NEUROLOGY
Payer: COMMERCIAL

## 2024-11-20 PROCEDURE — 97110 THERAPEUTIC EXERCISES: CPT | Performed by: PHYSICAL THERAPIST

## 2024-11-20 PROCEDURE — 20560 NDL INSJ W/O NJX 1 OR 2 MUSC: CPT | Performed by: PHYSICAL THERAPIST

## 2024-11-20 NOTE — FLOWSHEET NOTE
controlled at 1-2/10 to allow regular ADL  Long Term Goal 2: AROM extension 50-55 deg, rotations 85 deg  for more normal spinal mechanics- met  Long Term Goal 3: Able to complete work duty with pain controlled at 2/10  Long Term Goal 4: Minimize reliance on medications for pain control    Plan:   [x] Continue per plan of care [] Alter current plan (see comments)  [] Plan of care initiated [] Hold pending MD visit [] Discharge    Plan for Next Session:      Electronically signed by:  Frandy Tierney, PT, DPT

## 2024-11-25 ENCOUNTER — APPOINTMENT (OUTPATIENT)
Dept: PHYSICAL THERAPY | Age: 24
End: 2024-11-25
Attending: PSYCHIATRY & NEUROLOGY
Payer: COMMERCIAL

## 2024-11-27 ENCOUNTER — APPOINTMENT (OUTPATIENT)
Dept: PHYSICAL THERAPY | Age: 24
End: 2024-11-27
Attending: PSYCHIATRY & NEUROLOGY
Payer: COMMERCIAL

## 2024-12-02 ENCOUNTER — APPOINTMENT (OUTPATIENT)
Dept: PHYSICAL THERAPY | Age: 24
End: 2024-12-02
Attending: PSYCHIATRY & NEUROLOGY
Payer: COMMERCIAL

## 2024-12-03 ENCOUNTER — TELEPHONE (OUTPATIENT)
Dept: NEUROLOGY | Age: 24
End: 2024-12-03

## 2024-12-03 NOTE — TELEPHONE ENCOUNTER
Patient called into the office stating she has an appointment coming up in Jan 2025 and was wondering since it was talked about at her last visit about Botox. She was wondering if she could schedule a botox appointment and if it was approved. Please advise.

## 2024-12-04 ENCOUNTER — HOSPITAL ENCOUNTER (OUTPATIENT)
Dept: PHYSICAL THERAPY | Age: 24
Setting detail: THERAPIES SERIES
Discharge: HOME | End: 2024-12-04
Attending: PSYCHIATRY & NEUROLOGY
Payer: COMMERCIAL

## 2024-12-04 PROCEDURE — 20560 NDL INSJ W/O NJX 1 OR 2 MUSC: CPT | Performed by: PHYSICAL THERAPIST

## 2024-12-04 PROCEDURE — 97110 THERAPEUTIC EXERCISES: CPT | Performed by: PHYSICAL THERAPIST

## 2024-12-04 PROCEDURE — 97140 MANUAL THERAPY 1/> REGIONS: CPT | Performed by: PHYSICAL THERAPIST

## 2024-12-04 NOTE — FLOWSHEET NOTE
Physical Therapy Daily Treatment Note    Date:  2024    Patient Name:  Arlen Vo   \"KAITY\"  :  2000  MRN: [id number]  Restrictions/Precautions:     Medical/Treatment Diagnosis Information:   Diagnosis: M54.2 neck pain, G44.86 cervicogenic headache  Treatment Diagnosis: M54.2 neck pain, post derangement  Insurance/Certification information:  PT Insurance Information: [facility]  Physician Information:   Laxmi Anderson DO- [facility]  Plan of care signed (Y/N):  y  Visit# / total visits:  9/10  Pain level: 6/10       Time In:  8:06  Time Out: 8:54    Progress Note: []  Yes  [x]  No  Next due by: Visit #10  , or 24    Subjective:  \"I have been having a lot of migraines - multiple per day. It's hard to say if the dry needling helped last time because the migraines flared up. I don't get them in the middle of the night, but if I fall asleep with one, then it comes back \"    Objective:  ERICA performed per flow sheet for increased mobility and stability for improvements in reduction of symptoms and completion of ADLs. Verbal cueing for sequencing and proper form.  -IDN performed this date by Frandy Tierney, PT, DPT to decrease pain, trigger points, tone, and spasm as well as increase tissue extensibility. Specific placement and dose can be seen on separately scanned image.        Observation:   Significant change in C-spine mechanics    Test measurements:    Extension 50 deg.  R rotation 88, tight.   L rotation 90 deg , no pain   Tender at sub-occipital muscle group    Exercises: (no flexion)  Exercise/Equipment Resistance/Repetitions Other comments   R side bend 15x    Retraction  15x, x2  fingertip OP   Retraction/extension 10x, x2    Retraction/extension/rotation 10x    Active rotation 10x R, 10x L, x2 Fingertip OP   Isolated AA joint rotation 10xL, 10x R Side bend , fingertip on head for pivot point   Manual retraction/extension 5'         Occipital release  5'    OA joint flexion mob in

## 2024-12-06 ENCOUNTER — HOSPITAL ENCOUNTER (OUTPATIENT)
Dept: PHYSICAL THERAPY | Age: 24
Setting detail: THERAPIES SERIES
Discharge: HOME | End: 2024-12-06
Attending: PSYCHIATRY & NEUROLOGY
Payer: COMMERCIAL

## 2024-12-06 PROCEDURE — 97110 THERAPEUTIC EXERCISES: CPT | Performed by: PHYSICAL THERAPIST

## 2024-12-06 PROCEDURE — 97140 MANUAL THERAPY 1/> REGIONS: CPT | Performed by: PHYSICAL THERAPIST

## 2024-12-06 PROCEDURE — 20560 NDL INSJ W/O NJX 1 OR 2 MUSC: CPT | Performed by: PHYSICAL THERAPIST

## 2024-12-06 NOTE — FLOWSHEET NOTE
Physical Therapy Daily Treatment Note    Date:  2024    Patient Name:  Arlen Vo   \"KAITY\"  :  2000  MRN: [id number]  Restrictions/Precautions:     Medical/Treatment Diagnosis Information:   Diagnosis: M54.2 neck pain, G44.86 cervicogenic headache  Treatment Diagnosis: M54.2 neck pain, post derangement  Insurance/Certification information:  PT Insurance Information: [facility]  Physician Information:   Laxmi Bite DO- [facility]  Plan of care signed (Y/N):  y  Visit# / total visits:  10/10  Pain level: 4/10       Time In:  8:05  Time Out: 8:54    Progress Note: []  Yes  [x]  No  Next due by: Visit #10  , or 24    Subjective:  \"After the last session I noticed a little bruising where the needles had been placed in my neck, but it wasn't bad and I know it was to be expected. I did notice some relief, and it feels like it's something that is an itch that needs to be scratched, as in we're close to getting to the problem but not quite there yet.\"    Objective:  ERICA performed per flow sheet for increased mobility and stability for improvements in reduction of symptoms and completion of ADLs. Verbal cueing for sequencing and proper form.  -IDN performed this date by Frandy Tierney PT, DPT to decrease pain, trigger points, tone, and spasm as well as increase tissue extensibility. Specific placement and dose can be seen on separately scanned image.        Observation:   Significant change in C-spine mechanics    Test measurements:    Extension 50 deg.  R rotation 88, tight.   L rotation 90 deg , no pain   Tender at sub-occipital muscle group    Exercises: (no flexion)  added new postural based and cervical AROM exercises for increased strength and endurance. Verbal and visual cues were given to ensure correct technique and form as well as proper resistence for these exercises.   Exercise/Equipment Resistance/Repetitions Other comments   R side bend 15x    Retraction  15x, x2  fingertip OP

## 2024-12-11 ENCOUNTER — HOSPITAL ENCOUNTER (OUTPATIENT)
Dept: PHYSICAL THERAPY | Age: 24
Setting detail: THERAPIES SERIES
Discharge: HOME | End: 2024-12-11
Attending: PSYCHIATRY & NEUROLOGY
Payer: COMMERCIAL

## 2024-12-11 PROCEDURE — 20560 NDL INSJ W/O NJX 1 OR 2 MUSC: CPT | Performed by: PHYSICAL THERAPIST

## 2024-12-11 NOTE — FLOWSHEET NOTE
Physical Therapy Daily Treatment Note    Date:  2024    Patient Name:  Arlen Vo   \"KAITY\"  :  2000  MRN: [id number]  Restrictions/Precautions:     Medical/Treatment Diagnosis Information:   Diagnosis: M54.2 neck pain, G44.86 cervicogenic headache  Treatment Diagnosis: M54.2 neck pain, post derangement  Insurance/Certification information:  PT Insurance Information: [facility]  Physician Information:   Laxmi Anderson DO- [facility]  Plan of care signed (Y/N):  y  Visit# / total visits:  3/10 of 2nd POC   11 visits total  Pain level: 4/10       Time In:  1:47  Time Out: 2:01    Progress Note: []  Yes  [x]  No  Next due by: Visit #10  , or 24    Subjective:  Patient reports she has a work meeting to get to in 30 min so requests to have IDN treatment only this date. \"The neck has felt a definite improvement since we started the needling. I haven't had as many headaches, and not as intense over the past 4-5 days.\"    Objective:   -IDN performed this date by Frandy Tierney, PT, DPT to decrease pain, trigger points, tone, and spasm as well as increase tissue extensibility. Specific placement and dose can be seen on separately scanned image.        Observation:   Significant change in C-spine mechanics - improved tissue mobility and decreased spasm/tone in lower c-spine, though tension continues in upper c-spine    Test measurements:        Exercises: (no flexion)  added new postural based and cervical AROM exercises for increased strength and endurance. Verbal and visual cues were given to ensure correct technique and form as well as proper resistence for these exercises.   Exercise/Equipment Resistance/Repetitions Other comments   R side bend    Retraction   fingertip OP   Retraction/extension    Retraction/extension/rotation    Active rotation Fingertip OP   Isolated AA joint rotation Side bend , fingertip on head for pivot point   Tband rows, ext    Tband c-spine active rotations

## 2024-12-13 ENCOUNTER — HOSPITAL ENCOUNTER (OUTPATIENT)
Dept: PHYSICAL THERAPY | Age: 24
Setting detail: THERAPIES SERIES
Discharge: HOME | End: 2024-12-13
Attending: PSYCHIATRY & NEUROLOGY
Payer: COMMERCIAL

## 2024-12-13 PROCEDURE — 97110 THERAPEUTIC EXERCISES: CPT | Performed by: PHYSICAL THERAPIST

## 2024-12-13 PROCEDURE — 97140 MANUAL THERAPY 1/> REGIONS: CPT | Performed by: PHYSICAL THERAPIST

## 2024-12-13 NOTE — FLOWSHEET NOTE
Tband c-spine active rotations GREEN 10x each     Manual retraction/extension 5'         Occipital release  4'    OA joint flexion mob in supine 10x On towel roll         Cervical roll 2'         IDN           [x] Provided verbal/tactile cueing for activities related to strengthening, flexibility, endurance, ROM. (59867)  [] Provided verbal/tactile cueing for activities related to improving balance, coordination, kinesthetic sense, posture, motor skill, proprioception. (73543)    Therapeutic Activities:     [] Therapeutic activities, direct (one-on-one) patient contact (use of dynamic activities to improve functional performance). (59384)    Gait:   [] Provided training and instruction to the patient for ambulation re-education. (25426)    Self-Care/ADL's  [] Self-care/home management training and compensatory training, meal preparation, safety procedures, and instructions in use of assistive technology devices/adaptive equipment, direct one-on-one contact. (08633)    Home Exercise Program:   C-spine extension progression for post derangement. Now advanced to supine position  [x] Reviewed/Progressed HEP activities related to strengthening, flexibility, endurance, ROM. (09847)  [] Reviewed/Progressed HEP activities related to improving balance, coordination, kinesthetic sense, posture, motor skill, proprioception.  (67754)    Manual Treatments:    [] Provided manual therapy to mobilize soft tissue/joints for the purpose of modulating pain, promoting relaxation,  increasing ROM, reducing/eliminating soft tissue swelling/inflammation/restriction, improving soft tissue extensibility. (27357)    Service Based Modalities:      Timed Code Treatment Minutes: 30' there-ex; 9' manual    Total Treatment Minutes:   39'    Treatment/Activity Tolerance:  [x] Patient tolerated treatment well [] Patient limited by fatique  [] Patient limited by pain  [] Patient limited by other medical complications  [] Other:     Prognosis: [x]

## 2025-01-10 DIAGNOSIS — G43.829 MENSTRUAL MIGRAINE WITHOUT STATUS MIGRAINOSUS, NOT INTRACTABLE: ICD-10-CM

## 2025-01-10 DIAGNOSIS — N94.6 DYSMENORRHEA: ICD-10-CM

## 2025-01-13 RX ORDER — NORETHINDRONE ACETATE AND ETHINYL ESTRADIOL 1MG-20(21)
1 KIT ORAL DAILY
Qty: 84 TABLET | Refills: 0 | OUTPATIENT
Start: 2025-01-13

## 2025-01-13 RX ORDER — NORETHINDRONE ACETATE AND ETHINYL ESTRADIOL 1MG-20(21)
1 KIT ORAL DAILY
Qty: 84 TABLET | Refills: 0 | Status: SHIPPED | OUTPATIENT
Start: 2025-01-13

## 2025-01-18 NOTE — PROGRESS NOTES
[facility]  [address]  [address]  [place] [address]  Dept: [phone]    Chief complaint:   Chief Complaint   Patient presents with    Annual Exam     Pap 9/6/23 wnl       History Present Illness: Arlen is a 23 yo female who presents for her annual exam.  She says that she is having migraines with her periods, but does have migraines other times as well.  She is getting  next year and isn't sure if she wants to change her medications at this time or wait until after marriage when she is going to stop her OCPs anyway.  She denies any vaginal discharge or irritation.  She is sexually active with her fiance and denies any dyspareunia.  She denies any pelvic pain.  She denies any bowel or bladder issues.     Current Medications (OTC/Herbal):   Current Outpatient Medications   Medication Sig Dispense Refill    BLISOVI FE 1/20 1-20 MG-MCG per tablet TAKE 1 TABLET BY MOUTH EVERY DAY 84 tablet 0    naratriptan (AMERGE) 2.5 MG tablet Take 1 tablet by mouth See Admin Instructions 2.5 mg at onset of headache, may repeat in 4 hours if needed 9 tablet 11    magnesium oxide (MAG-OX) 400 (240 Mg) MG tablet TAKE 1 TABLET BY MOUTH EVERY DAY 30 tablet 5    rizatriptan (MAXALT) 10 MG tablet Take 1 tablet by mouth as needed for Migraine May repeat in 2 hours if needed 10 tablet 5    fluticasone (FLONASE) 50 MCG/ACT nasal spray 1 spray by Each Nostril route 2 times daily 16 g 0    cyclobenzaprine (FLEXERIL) 5 MG tablet Take 1 tablet by mouth daily as needed for Muscle spasms 30 tablet 11    ondansetron (ZOFRAN-ODT) 4 MG disintegrating tablet Take 1 tablet by mouth 3 times daily as needed for Nausea or Vomiting 21 tablet 11    ibuprofen (ADVIL;MOTRIN) 600 MG tablet Take 1 tablet by mouth 3 times daily (with meals) 30 tablet 0     No current facility-administered medications for this visit.     Allergies:   Allergies   Allergen Reactions

## 2025-01-21 ENCOUNTER — OFFICE VISIT (OUTPATIENT)
Dept: OBGYN CLINIC | Age: 25
End: 2025-01-21
Payer: COMMERCIAL

## 2025-01-21 VITALS
SYSTOLIC BLOOD PRESSURE: 126 MMHG | HEIGHT: 63 IN | DIASTOLIC BLOOD PRESSURE: 84 MMHG | HEART RATE: 90 BPM | WEIGHT: 165 LBS | BODY MASS INDEX: 29.23 KG/M2

## 2025-01-21 DIAGNOSIS — Z01.419 ENCOUNTER FOR GYNECOLOGICAL EXAMINATION: Primary | ICD-10-CM

## 2025-01-21 DIAGNOSIS — G43.829 MENSTRUAL MIGRAINE WITHOUT STATUS MIGRAINOSUS, NOT INTRACTABLE: ICD-10-CM

## 2025-01-21 DIAGNOSIS — N94.6 DYSMENORRHEA: ICD-10-CM

## 2025-01-21 PROCEDURE — 99395 PREV VISIT EST AGE 18-39: CPT | Performed by: OBSTETRICS & GYNECOLOGY

## 2025-01-21 PROCEDURE — 99459 PELVIC EXAMINATION: CPT | Performed by: OBSTETRICS & GYNECOLOGY

## 2025-01-21 RX ORDER — NORETHINDRONE ACETATE AND ETHINYL ESTRADIOL 1MG-20(21)
1 KIT ORAL DAILY
Qty: 84 TABLET | Refills: 3 | Status: SHIPPED | OUTPATIENT
Start: 2025-01-21

## 2025-01-21 ASSESSMENT — ENCOUNTER SYMPTOMS
SHORTNESS OF BREATH: 0
BACK PAIN: 0
COUGH: 0
ABDOMINAL PAIN: 0

## 2025-01-24 NOTE — PROGRESS NOTES
[facility]  [address]  [place] [address]  Dept: [phone]    PATIENT NAME: Arlen Vo  PATIENT MRN: [id number]  PRIMARY CARE PHYSICIAN: Liliya Lazaro PA    HPI:      Arlen Vo is a 24 y.o. female who I initially saw (transfer of care from Dr. Short) on 10/21/2024.  Her history is summarized as follows:     Arlen Vo initially developed headaches around the time of menarche around age 13 or 14 years.  Headaches are predominantly located in the left frontotemporal region, but may also start occipitally moved frontally.  Headaches are typically preceded by visual symptoms described as \"TV fuzz\".  Headaches are described as burning and sharp pain.  The pain varies in intensity, but may reach 10 out of 10.  She has associated symptoms including nausea, vomiting, photophobia, phonophobia, vision change, and paresthesias involving the right and left upper extremities.  She has no history of head or neck trauma.  Her mother has migraine.  She underwent MRI of the brain on 8/2/2022, which was unremarkable.    TODAY'S EVALUTION:    Arlen Vo  was last seen in clinic by me on 10/21/2024.  She denied any new or worsening neurological symptoms.  She started physical therapy for her neck and has done acupuncture x 1.  She started naratriptan since her last visit, and tolerates this well.  She states that it only takes the edge off her pain.  She had a 4-day long severe headache since her last visit.  She is planning to start Botox.      HEADACHE SUMMARY:  Headache location: Left frontal temporal, left occipital radiating forward  Headache quality: Burning and sharp  Associated factors: Preceded by TV fuzz vision and a feeling  like arms are jello.  Then facial numbness. Nausea, vomiting, photophobia, phonophobia, paresthesias in the arms.  Intensity: Reach 10 out of 10 in severity  Age of Onset: 13 or 14 years  Headache frequency: May be daily for a

## 2025-01-27 ENCOUNTER — OFFICE VISIT (OUTPATIENT)
Dept: NEUROLOGY | Age: 25
End: 2025-01-27
Payer: COMMERCIAL

## 2025-01-27 ENCOUNTER — TELEPHONE (OUTPATIENT)
Dept: NEUROLOGY | Age: 25
End: 2025-01-27

## 2025-01-27 VITALS
WEIGHT: 170 LBS | HEIGHT: 63 IN | BODY MASS INDEX: 30.12 KG/M2 | SYSTOLIC BLOOD PRESSURE: 116 MMHG | DIASTOLIC BLOOD PRESSURE: 82 MMHG | HEART RATE: 83 BPM

## 2025-01-27 DIAGNOSIS — G44.86 CERVICOGENIC HEADACHE: ICD-10-CM

## 2025-01-27 DIAGNOSIS — G43.E19 INTRACTABLE CHRONIC MIGRAINE WITH AURA AND WITHOUT STATUS MIGRAINOSUS: Primary | ICD-10-CM

## 2025-01-27 PROCEDURE — G8417 CALC BMI ABV UP PARAM F/U: HCPCS | Performed by: PSYCHIATRY & NEUROLOGY

## 2025-01-27 PROCEDURE — G8427 DOCREV CUR MEDS BY ELIG CLIN: HCPCS | Performed by: PSYCHIATRY & NEUROLOGY

## 2025-01-27 PROCEDURE — 1036F TOBACCO NON-USER: CPT | Performed by: PSYCHIATRY & NEUROLOGY

## 2025-01-27 PROCEDURE — 99213 OFFICE O/P EST LOW 20 MIN: CPT | Performed by: PSYCHIATRY & NEUROLOGY

## 2025-01-27 RX ORDER — NARATRIPTAN 2.5 MG/1
2.5 TABLET ORAL SEE ADMIN INSTRUCTIONS
Qty: 9 TABLET | Refills: 11 | Status: SHIPPED | OUTPATIENT
Start: 2025-01-27

## 2025-01-27 NOTE — TELEPHONE ENCOUNTER
----- Message from Dr. Laxmi Anderson, DO sent at 1/27/2025  9:25 AM EST -----  Regarding: Botox  Can you guys get her set up for botox for migraine asap?     Thanks,     AVB

## 2025-01-27 NOTE — TELEPHONE ENCOUNTER
01/27/25  Please submit PA to patient's ins per Dr Anderson for Botox for Migraines/Headaches.  Thanks KB

## 2025-01-29 NOTE — TELEPHONE ENCOUNTER
Casandra LOYD with [facility] called in with a request for additional information. Additional information provided.